# Patient Record
Sex: FEMALE | HISPANIC OR LATINO | Employment: FULL TIME | URBAN - METROPOLITAN AREA
[De-identification: names, ages, dates, MRNs, and addresses within clinical notes are randomized per-mention and may not be internally consistent; named-entity substitution may affect disease eponyms.]

---

## 2019-04-11 ENCOUNTER — APPOINTMENT (OUTPATIENT)
Dept: RADIOLOGY | Facility: MEDICAL CENTER | Age: 39
DRG: 189 | End: 2019-04-11
Attending: EMERGENCY MEDICINE
Payer: COMMERCIAL

## 2019-04-11 ENCOUNTER — HOSPITAL ENCOUNTER (INPATIENT)
Facility: MEDICAL CENTER | Age: 39
LOS: 2 days | DRG: 189 | End: 2019-04-13
Attending: EMERGENCY MEDICINE | Admitting: INTERNAL MEDICINE
Payer: COMMERCIAL

## 2019-04-11 DIAGNOSIS — J45.901 MODERATE ASTHMA WITH ACUTE EXACERBATION, UNSPECIFIED WHETHER PERSISTENT: ICD-10-CM

## 2019-04-11 PROBLEM — J96.01 ACUTE RESPIRATORY FAILURE WITH HYPOXIA (HCC): Status: ACTIVE | Noted: 2019-04-11

## 2019-04-11 PROBLEM — R01.1 MURMUR: Status: ACTIVE | Noted: 2019-04-11

## 2019-04-11 PROBLEM — J45.41 MODERATE PERSISTENT ASTHMA WITH ACUTE EXACERBATION: Status: ACTIVE | Noted: 2019-04-11

## 2019-04-11 LAB
ALBUMIN SERPL BCP-MCNC: 4.5 G/DL (ref 3.2–4.9)
ALBUMIN/GLOB SERPL: 1.5 G/DL
ALP SERPL-CCNC: 75 U/L (ref 30–99)
ALT SERPL-CCNC: 15 U/L (ref 2–50)
ANION GAP SERPL CALC-SCNC: 7 MMOL/L (ref 0–11.9)
APPEARANCE UR: CLEAR
AST SERPL-CCNC: 14 U/L (ref 12–45)
BACTERIA #/AREA URNS HPF: NEGATIVE /HPF
BASOPHILS # BLD AUTO: 0.4 % (ref 0–1.8)
BASOPHILS # BLD: 0.04 K/UL (ref 0–0.12)
BILIRUB SERPL-MCNC: 0.4 MG/DL (ref 0.1–1.5)
BILIRUB UR QL STRIP.AUTO: NEGATIVE
BUN SERPL-MCNC: 6 MG/DL (ref 8–22)
CALCIUM SERPL-MCNC: 9.3 MG/DL (ref 8.5–10.5)
CHLORIDE SERPL-SCNC: 106 MMOL/L (ref 96–112)
CO2 SERPL-SCNC: 24 MMOL/L (ref 20–33)
COLOR UR AUTO: YELLOW
COLOR UR AUTO: YELLOW
COLOR UR: YELLOW
CREAT SERPL-MCNC: 0.54 MG/DL (ref 0.5–1.4)
EKG IMPRESSION: NORMAL
EOSINOPHIL # BLD AUTO: 0.07 K/UL (ref 0–0.51)
EOSINOPHIL NFR BLD: 0.7 % (ref 0–6.9)
EPI CELLS #/AREA URNS HPF: ABNORMAL /HPF
ERYTHROCYTE [DISTWIDTH] IN BLOOD BY AUTOMATED COUNT: 46.1 FL (ref 35.9–50)
FLUAV RNA SPEC QL NAA+PROBE: NEGATIVE
FLUBV RNA SPEC QL NAA+PROBE: NEGATIVE
GLOBULIN SER CALC-MCNC: 3.1 G/DL (ref 1.9–3.5)
GLUCOSE SERPL-MCNC: 146 MG/DL (ref 65–99)
GLUCOSE UR QL STRIP.AUTO: NEGATIVE MG/DL
GLUCOSE UR QL STRIP.AUTO: NEGATIVE MG/DL
GLUCOSE UR STRIP.AUTO-MCNC: NEGATIVE MG/DL
HCG UR QL: NEGATIVE
HCT VFR BLD AUTO: 43.5 % (ref 37–47)
HGB BLD-MCNC: 14.1 G/DL (ref 12–16)
HYALINE CASTS #/AREA URNS LPF: ABNORMAL /LPF
IMM GRANULOCYTES # BLD AUTO: 0.03 K/UL (ref 0–0.11)
IMM GRANULOCYTES NFR BLD AUTO: 0.3 % (ref 0–0.9)
KETONES UR QL STRIP.AUTO: 40 MG/DL
KETONES UR QL STRIP.AUTO: 40 MG/DL
KETONES UR STRIP.AUTO-MCNC: 15 MG/DL
LEUKOCYTE ESTERASE UR QL STRIP.AUTO: NEGATIVE
LYMPHOCYTES # BLD AUTO: 0.44 K/UL (ref 1–4.8)
LYMPHOCYTES NFR BLD: 4.6 % (ref 22–41)
MCH RBC QN AUTO: 30 PG (ref 27–33)
MCHC RBC AUTO-ENTMCNC: 32.4 G/DL (ref 33.6–35)
MCV RBC AUTO: 92.6 FL (ref 81.4–97.8)
MICRO URNS: ABNORMAL
MONOCYTES # BLD AUTO: 0.18 K/UL (ref 0–0.85)
MONOCYTES NFR BLD AUTO: 1.9 % (ref 0–13.4)
NEUTROPHILS # BLD AUTO: 8.76 K/UL (ref 2–7.15)
NEUTROPHILS NFR BLD: 92.1 % (ref 44–72)
NITRITE UR QL STRIP.AUTO: NEGATIVE
NRBC # BLD AUTO: 0 K/UL
NRBC BLD-RTO: 0 /100 WBC
PH UR STRIP.AUTO: 6 [PH]
PLATELET # BLD AUTO: 276 K/UL (ref 164–446)
PMV BLD AUTO: 9.9 FL (ref 9–12.9)
POTASSIUM SERPL-SCNC: 3.8 MMOL/L (ref 3.6–5.5)
PROCALCITONIN SERPL-MCNC: <0.05 NG/ML
PROCALCITONIN SERPL-MCNC: <0.05 NG/ML
PROT SERPL-MCNC: 7.6 G/DL (ref 6–8.2)
PROT UR QL STRIP: NEGATIVE MG/DL
RBC # BLD AUTO: 4.7 M/UL (ref 4.2–5.4)
RBC # URNS HPF: ABNORMAL /HPF
RBC UR QL AUTO: ABNORMAL
SODIUM SERPL-SCNC: 137 MMOL/L (ref 135–145)
SP GR UR STRIP.AUTO: 1.01
SP GR UR: 1.01
SP GR UR: 1.02
TROPONIN I SERPL-MCNC: <0.01 NG/ML (ref 0–0.04)
UROBILINOGEN UR STRIP.AUTO-MCNC: 0.2 MG/DL
WBC # BLD AUTO: 9.5 K/UL (ref 4.8–10.8)
WBC #/AREA URNS HPF: ABNORMAL /HPF

## 2019-04-11 PROCEDURE — 80053 COMPREHEN METABOLIC PANEL: CPT

## 2019-04-11 PROCEDURE — 84484 ASSAY OF TROPONIN QUANT: CPT

## 2019-04-11 PROCEDURE — 700111 HCHG RX REV CODE 636 W/ 250 OVERRIDE (IP): Performed by: EMERGENCY MEDICINE

## 2019-04-11 PROCEDURE — 93005 ELECTROCARDIOGRAM TRACING: CPT | Performed by: EMERGENCY MEDICINE

## 2019-04-11 PROCEDURE — 81025 URINE PREGNANCY TEST: CPT

## 2019-04-11 PROCEDURE — 700102 HCHG RX REV CODE 250 W/ 637 OVERRIDE(OP): Performed by: INTERNAL MEDICINE

## 2019-04-11 PROCEDURE — 84145 PROCALCITONIN (PCT): CPT | Mod: 91

## 2019-04-11 PROCEDURE — 99223 1ST HOSP IP/OBS HIGH 75: CPT | Performed by: INTERNAL MEDICINE

## 2019-04-11 PROCEDURE — 81001 URINALYSIS AUTO W/SCOPE: CPT

## 2019-04-11 PROCEDURE — 85025 COMPLETE CBC W/AUTO DIFF WBC: CPT

## 2019-04-11 PROCEDURE — 99285 EMERGENCY DEPT VISIT HI MDM: CPT

## 2019-04-11 PROCEDURE — 700101 HCHG RX REV CODE 250: Performed by: EMERGENCY MEDICINE

## 2019-04-11 PROCEDURE — 36415 COLL VENOUS BLD VENIPUNCTURE: CPT

## 2019-04-11 PROCEDURE — 770006 HCHG ROOM/CARE - MED/SURG/GYN SEMI*

## 2019-04-11 PROCEDURE — 81002 URINALYSIS NONAUTO W/O SCOPE: CPT | Mod: 91

## 2019-04-11 PROCEDURE — 87502 INFLUENZA DNA AMP PROBE: CPT

## 2019-04-11 PROCEDURE — 71045 X-RAY EXAM CHEST 1 VIEW: CPT

## 2019-04-11 PROCEDURE — 94640 AIRWAY INHALATION TREATMENT: CPT

## 2019-04-11 PROCEDURE — A9270 NON-COVERED ITEM OR SERVICE: HCPCS | Performed by: INTERNAL MEDICINE

## 2019-04-11 PROCEDURE — 99407 BEHAV CHNG SMOKING > 10 MIN: CPT

## 2019-04-11 PROCEDURE — 700111 HCHG RX REV CODE 636 W/ 250 OVERRIDE (IP): Performed by: INTERNAL MEDICINE

## 2019-04-11 PROCEDURE — 304561 HCHG STAT O2

## 2019-04-11 RX ORDER — POLYETHYLENE GLYCOL 3350 17 G/17G
1 POWDER, FOR SOLUTION ORAL
Status: DISCONTINUED | OUTPATIENT
Start: 2019-04-11 | End: 2019-04-13 | Stop reason: HOSPADM

## 2019-04-11 RX ORDER — IPRATROPIUM BROMIDE AND ALBUTEROL SULFATE 2.5; .5 MG/3ML; MG/3ML
3 SOLUTION RESPIRATORY (INHALATION)
Status: DISCONTINUED | OUTPATIENT
Start: 2019-04-12 | End: 2019-04-13

## 2019-04-11 RX ORDER — BUDESONIDE AND FORMOTEROL FUMARATE DIHYDRATE 80; 4.5 UG/1; UG/1
2 AEROSOL RESPIRATORY (INHALATION)
Status: DISCONTINUED | OUTPATIENT
Start: 2019-04-11 | End: 2019-04-13 | Stop reason: HOSPADM

## 2019-04-11 RX ORDER — PREDNISONE 20 MG/1
60 TABLET ORAL ONCE
Status: COMPLETED | OUTPATIENT
Start: 2019-04-11 | End: 2019-04-11

## 2019-04-11 RX ORDER — BISACODYL 10 MG
10 SUPPOSITORY, RECTAL RECTAL
Status: DISCONTINUED | OUTPATIENT
Start: 2019-04-11 | End: 2019-04-13 | Stop reason: HOSPADM

## 2019-04-11 RX ORDER — ONDANSETRON 4 MG/1
4 TABLET, ORALLY DISINTEGRATING ORAL ONCE
Status: COMPLETED | OUTPATIENT
Start: 2019-04-11 | End: 2019-04-11

## 2019-04-11 RX ORDER — AMOXICILLIN 250 MG
2 CAPSULE ORAL 2 TIMES DAILY
Status: DISCONTINUED | OUTPATIENT
Start: 2019-04-11 | End: 2019-04-13 | Stop reason: HOSPADM

## 2019-04-11 RX ORDER — LORATADINE 10 MG/1
10 TABLET ORAL DAILY
Status: DISCONTINUED | OUTPATIENT
Start: 2019-04-12 | End: 2019-04-13 | Stop reason: HOSPADM

## 2019-04-11 RX ORDER — LACTOBACILLUS RHAMNOSUS GG 10B CELL
1 CAPSULE ORAL
Status: DISCONTINUED | OUTPATIENT
Start: 2019-04-12 | End: 2019-04-13 | Stop reason: HOSPADM

## 2019-04-11 RX ORDER — IPRATROPIUM BROMIDE AND ALBUTEROL SULFATE 2.5; .5 MG/3ML; MG/3ML
3 SOLUTION RESPIRATORY (INHALATION)
Status: DISCONTINUED | OUTPATIENT
Start: 2019-04-11 | End: 2019-04-13 | Stop reason: HOSPADM

## 2019-04-11 RX ORDER — PREDNISONE 20 MG/1
40 TABLET ORAL DAILY
Status: DISCONTINUED | OUTPATIENT
Start: 2019-04-11 | End: 2019-04-13 | Stop reason: HOSPADM

## 2019-04-11 RX ORDER — MONTELUKAST SODIUM 10 MG/1
10 TABLET ORAL NIGHTLY
Status: DISCONTINUED | OUTPATIENT
Start: 2019-04-11 | End: 2019-04-13 | Stop reason: HOSPADM

## 2019-04-11 RX ORDER — DOXYCYCLINE 100 MG/1
100 TABLET ORAL EVERY 12 HOURS
Status: DISCONTINUED | OUTPATIENT
Start: 2019-04-11 | End: 2019-04-13 | Stop reason: HOSPADM

## 2019-04-11 RX ORDER — ACETAMINOPHEN 325 MG/1
650 TABLET ORAL EVERY 6 HOURS PRN
Status: DISCONTINUED | OUTPATIENT
Start: 2019-04-11 | End: 2019-04-13 | Stop reason: HOSPADM

## 2019-04-11 RX ADMIN — MONTELUKAST SODIUM 10 MG: 10 TABLET, COATED ORAL at 19:50

## 2019-04-11 RX ADMIN — IPRATROPIUM BROMIDE 0.5 MG: 0.5 SOLUTION RESPIRATORY (INHALATION) at 11:35

## 2019-04-11 RX ADMIN — ALBUTEROL SULFATE 2.5 MG: 2.5 SOLUTION RESPIRATORY (INHALATION) at 13:17

## 2019-04-11 RX ADMIN — DOXYCYCLINE 100 MG: 100 TABLET, FILM COATED ORAL at 19:49

## 2019-04-11 RX ADMIN — PREDNISONE 40 MG: 20 TABLET ORAL at 19:49

## 2019-04-11 RX ADMIN — ONDANSETRON 4 MG: 4 TABLET, ORALLY DISINTEGRATING ORAL at 11:39

## 2019-04-11 RX ADMIN — ALBUTEROL SULFATE 2.5 MG: 2.5 SOLUTION RESPIRATORY (INHALATION) at 15:00

## 2019-04-11 RX ADMIN — PREDNISONE 60 MG: 20 TABLET ORAL at 11:39

## 2019-04-11 RX ADMIN — BUDESONIDE AND FORMOTEROL FUMARATE DIHYDRATE 2 PUFF: 80; 4.5 AEROSOL RESPIRATORY (INHALATION) at 23:25

## 2019-04-11 RX ADMIN — IPRATROPIUM BROMIDE 0.5 MG: 0.5 SOLUTION RESPIRATORY (INHALATION) at 15:00

## 2019-04-11 RX ADMIN — IPRATROPIUM BROMIDE 0.5 MG: 0.5 SOLUTION RESPIRATORY (INHALATION) at 13:17

## 2019-04-11 RX ADMIN — ALBUTEROL SULFATE 2.5 MG: 2.5 SOLUTION RESPIRATORY (INHALATION) at 11:35

## 2019-04-11 ASSESSMENT — LIFESTYLE VARIABLES
ON A TYPICAL DAY WHEN YOU DRINK ALCOHOL HOW MANY DRINKS DO YOU HAVE: 1
EVER HAD A DRINK FIRST THING IN THE MORNING TO STEADY YOUR NERVES TO GET RID OF A HANGOVER: NO
HOW MANY TIMES IN THE PAST YEAR HAVE YOU HAD 5 OR MORE DRINKS IN A DAY: 0
EVER_SMOKED: NEVER
TOTAL SCORE: 0
HAVE PEOPLE ANNOYED YOU BY CRITICIZING YOUR DRINKING: NO
ALCOHOL_USE: YES
HAVE YOU EVER FELT YOU SHOULD CUT DOWN ON YOUR DRINKING: NO
EVER FELT BAD OR GUILTY ABOUT YOUR DRINKING: NO
CONSUMPTION TOTAL: NEGATIVE
TOTAL SCORE: 0
TOTAL SCORE: 0
EVER_SMOKED: NEVER
AVERAGE NUMBER OF DAYS PER WEEK YOU HAVE A DRINK CONTAINING ALCOHOL: 1

## 2019-04-11 ASSESSMENT — COGNITIVE AND FUNCTIONAL STATUS - GENERAL
SUGGESTED CMS G CODE MODIFIER MOBILITY: CH
MOBILITY SCORE: 24
SUGGESTED CMS G CODE MODIFIER DAILY ACTIVITY: CH
DAILY ACTIVITIY SCORE: 24

## 2019-04-11 ASSESSMENT — PATIENT HEALTH QUESTIONNAIRE - PHQ9
1. LITTLE INTEREST OR PLEASURE IN DOING THINGS: NOT AT ALL
SUM OF ALL RESPONSES TO PHQ9 QUESTIONS 1 AND 2: 0
2. FEELING DOWN, DEPRESSED, IRRITABLE, OR HOPELESS: NOT AT ALL

## 2019-04-11 ASSESSMENT — COPD QUESTIONNAIRES
HAVE YOU SMOKED AT LEAST 100 CIGARETTES IN YOUR ENTIRE LIFE: NO/DON'T KNOW
DO YOU EVER COUGH UP ANY MUCUS OR PHLEGM?: NO/ONLY WITH OCCASIONAL COLDS OR INFECTIONS
DURING THE PAST 4 WEEKS HOW MUCH DID YOU FEEL SHORT OF BREATH: SOME OF THE TIME
COPD SCREENING SCORE: 1

## 2019-04-11 NOTE — ED NOTES
Verbalized relief of breathing, still has wheezing on auscultation. Will call RT for repeat nebulizer treatment.

## 2019-04-11 NOTE — FLOWSHEET NOTE
04/11/19 1319   Interdisciplinary Plan of Care-Goals (Indications)   Bronchodilator Indications History / Diagnosis   Interdisciplinary Plan of Care-Outcomes    Bronchodilator Outcome Improved Vital Signs and Measures of Gas Exchange   RT Assessment of Delivered Medications   Evaluation of Medication Delivery Daily Yes-- Pt /Family has been Instructed in use of Respiratory Medications and Adverse Reactions   SVN Group   #SVN Performed Yes   Given By: Mouthpiece   Chest Exam   Respiration 18   Pulse 85   Breath Sounds   RUL Breath Sounds Diminished   RML Breath Sounds Diminished   RLL Breath Sounds Diminished   PETRA Breath Sounds Diminished   LLL Breath Sounds Diminished   Oximetry   Continuous Oximetry Yes   O2 Alarms Set & Reviewed Yes   Oxygen   Pulse Oximetry 96 %   O2 (LPM) 2   O2 Daily Delivery Respiratory  Silicone Nasal Cannula

## 2019-04-11 NOTE — H&P
Hospital Medicine History & Physical Note    Date of Service  4/11/2019    Primary Care Physician  Pcp Pt States None    Consultants      Code Status  full    Chief Complaint  Shortness of Breath (audible wheezes, unrelieved with inhaler)      History of Presenting Illness  38 y.o. female who presented 4/11/2019 with Shortness of Breath (audible wheezes, unrelieved with inhaler)  History of asthma.,  Cough, a little yellow phlegm, SOB wheezing since yesterday.   No fever or chills  No sick contacts, no recent travel  No heart problems.  Denies smoking or alcohol.  At the ED, afebrile, hemodynamically stable. Hypoxic, put on O2.  CXR clear.  No leukocytosois.   When I saw her at the ED, no acute distress. Minimal wheezing. Family at bedside.  Review of Systems  ROS    Past Medical History   has a past medical history of AMA (advanced maternal age) multigravida 35+ (7/19/2016).    Surgical History   has no past surgical history on file.     Family History  family history includes Diabetes in her mother.   Asthma in her aunt  Social History   reports that she has never smoked. She has never used smokeless tobacco. She reports that she does not drink alcohol or use drugs.    Allergies  No Known Allergies    Medications  None       Physical Exam  Temp:  [36.7 °C (98 °F)] 36.7 °C (98 °F)  Pulse:  [82-96] 82  Resp:  [18-26] 18  BP: (130)/(84) 130/84  SpO2:  [94 %-97 %] 94 %    Physical Exam   Constitutional: She appears well-developed.   HENT:   Head: Normocephalic and atraumatic.   Eyes: Conjunctivae are normal. No scleral icterus.   Neck: Normal range of motion. Neck supple.   Cardiovascular: Normal rate and regular rhythm.  Exam reveals no gallop and no friction rub.    Murmur heard.  Pulmonary/Chest: Effort normal. No respiratory distress. She has wheezes. She has no rales.   Abdominal: Soft. Bowel sounds are normal. She exhibits no distension. There is no tenderness. There is no rebound and no guarding.    Musculoskeletal: She exhibits no edema or tenderness.   Neurological: She is alert.   Skin: Skin is warm.   Psychiatric: She has a normal mood and affect. Her behavior is normal.       Laboratory:  Recent Labs      04/11/19   1450   WBC  9.5   RBC  4.70   HEMOGLOBIN  14.1   HEMATOCRIT  43.5   MCV  92.6   MCH  30.0   MCHC  32.4*   RDW  46.1   PLATELETCT  276   MPV  9.9     Recent Labs      04/11/19   1450   SODIUM  137   POTASSIUM  3.8   CHLORIDE  106   CO2  24   GLUCOSE  146*   BUN  6*   CREATININE  0.54   CALCIUM  9.3     Recent Labs      04/11/19   1450   ALTSGPT  15   ASTSGOT  14   ALKPHOSPHAT  75   TBILIRUBIN  0.4   GLUCOSE  146*                 Recent Labs      04/11/19   1450   TROPONINI  <0.01       Urinalysis:    Recent Labs      04/11/19   1211   SPECGRAVITY  1.011   GLUCOSEUR  Negative   KETONES  15*   NITRITE  Negative   LEUKESTERAS  Negative   WBCURINE  0-2   RBCURINE  2-5*   BACTERIA  Negative   EPITHELCELL  Few        Imaging:  DX-CHEST-PORTABLE (1 VIEW)   Final Result      No acute cardiopulmonary disease evident.      EC-ECHOCARDIOGRAM COMPLETE W/O CONT    (Results Pending)         Assessment/Plan:  I anticipate this patient will require at least two midnights for appropriate medical management, necessitating inpatient admission.    * Acute respiratory failure with hypoxia with asthma exacerbation (HCC)   Assessment & Plan    Resp and O2 per protocol  Treat asthma     Moderate persistent asthma with acute exacerbation   Assessment & Plan    Resp (breathing treatments) and O2 per protocol  PO steroids  PO antibiotics, follow procalcitonin  Montelukast, Claritin ordered     Murmur   Assessment & Plan    Ordered echo         VTE prophylaxis: Loevnox SQ  Reviewed vitals, labs, imaging, staff notes.  Discussed assessment and plan with Quincy Vela  Discussed with ED physician.

## 2019-04-11 NOTE — FLOWSHEET NOTE
04/11/19 1502   Events/Summary/Plan   Events/Summary/Plan Repeat SVN given   SVN Group   #SVN Performed Yes   Given By: Mouthpiece   Respiratory WDL   Respiratory (WDL) X   Chest Exam   Work Of Breathing / Effort Mild   Respiration 18   Pulse 82   Breath Sounds   Pre/Post Intervention Pre Intervention Assessment   RUL Breath Sounds Inspiratory Wheezes   RML Breath Sounds Diminished   RLL Breath Sounds Diminished   PETRA Breath Sounds Inspiratory Wheezes   LLL Breath Sounds Diminished   Oximetry   Continuous Oximetry Yes   Oxygen   Pulse Oximetry 94 %   O2 (LPM) 2   O2 Daily Delivery Respiratory  Silicone Nasal Cannula

## 2019-04-11 NOTE — ED TRIAGE NOTES
Chief Complaint   Patient presents with   • Shortness of Breath     audible wheezes, unrelieved with inhaler

## 2019-04-11 NOTE — ED NOTES
Pt sating between 88-90% on ra. RT called for repeat nebulizer tx. Placed back on 2lnc. Updated with the poc.  piv established blood collected, sent to lab. ED tech to do EKG.

## 2019-04-11 NOTE — ED PROVIDER NOTES
ED Provider Note    CHIEF COMPLAINT  Chief Complaint   Patient presents with   • Shortness of Breath     audible wheezes, unrelieved with inhaler       HPI  Quincy Vela is a 38 y.o. female with a history of asthma who presents with cough, congestion, difficulty breathing for the past 2 days.  The patient has been using her inhaler without improvement.  She has had subjective fevers and chills along with a sore throat, cough, and congestion.  She says the cough has been productive of yellowish sputum.  She did have one episode of vomiting today.  She denies any current chest pain or abdominal pain, but has had some low back pain.  She denies any urinary symptoms or diarrhea.    REVIEW OF SYSTEMS  See HPI for further details. All other systems are negative.     PAST MEDICAL HISTORY  Past Medical History:   Diagnosis Date   • AMA (advanced maternal age) multigravida 35+ 7/19/2016       FAMILY HISTORY  Family History   Problem Relation Age of Onset   • Diabetes Mother         takes meds       SOCIAL HISTORY  Social History     Social History   • Marital status: Single     Spouse name: N/A   • Number of children: N/A   • Years of education: N/A     Social History Main Topics   • Smoking status: Never Smoker   • Smokeless tobacco: Never Used   • Alcohol use No   • Drug use: No   • Sexual activity: Yes     Partners: Male      Comment: NONE      Other Topics Concern   • Not on file     Social History Narrative   • No narrative on file       SURGICAL HISTORY  History reviewed. No pertinent surgical history.    CURRENT MEDICATIONS  Home Medications     Reviewed by Shyanne Atwood R.N. (Registered Nurse) on 04/11/19 at 1055  Med List Status: Partial   Medication Last Dose Status   docusate sodium 100 MG Cap not taking Active   hydrocodone-acetaminophen (NORCO) 5-325 MG Tab per tablet not taking Active   ibuprofen (MOTRIN) 800 MG Tab not taking Active   Prenatal MV-Min-Fe Fum-FA-DHA (PRENATAL 1 PO) not taking  "Active                ALLERGIES  No Known Allergies    PHYSICAL EXAM  VITAL SIGNS: /84   Pulse 96   Temp 36.7 °C (98 °F) (Temporal)   Resp (!) 26   Ht 1.651 m (5' 5\")   Wt 79.4 kg (175 lb 0.7 oz)   SpO2 97%   BMI 29.13 kg/m²   Constitutional: Awake, alert, appears mildly tachypneic, nontoxic.  HENT: Atraumatic. Bilateral external ears normal, mucous membranes moist, throat nonerythematous without exudates, nose is normal.  Eyes: PERRL, EOMI, conjunctiva moist, noninjected.  Neck: Nontender, Normal range of motion, No nuchal rigidity, No stridor.   Lymphatic: No lymphadenopathy noted.   Cardiovascular: Regular rate and rhythm, no murmurs, rubs, gallops.  Thorax & Lungs: Diminished breath sounds bilaterally, diffuse expiratory wheezes, mild intercostal retractions and use of accessory muscles, no rales.  No chest tenderness.  Abdomen: Bowel sounds normal, Soft, nontender, nondistended, no rebound, guarding, masses.  Back: No CVA or spinal tenderness,   Extremities: Intact distal pulses, No edema, No tenderness.   Skin: Warm, Dry, No rashes.   Musculoskeletal: No joint swelling or tenderness.  Neurologic: Alert & oriented x 3, sensory and motor function normal. No focal deficits.   Psychiatric: Affect normal, Judgment normal, Mood normal.       RADIOLOGY/PROCEDURES  DX-CHEST-PORTABLE (1 VIEW)   Final Result      No acute cardiopulmonary disease evident.        EKG:  Twelve-lead EKG shows a normal sinus rhythm, rate of 94, normal intervals, normal axis, no acute ST wave elevations or ST depressions, no pathologic Q waves, no evidence of an acute injury or ischemic pattern on my reading, there is no previous EKG for comparison.    COURSE & MEDICAL DECISION MAKING  Pertinent Labs & Imaging studies reviewed. (See chart for details)  The patient presents with the above complaints.  She showed increased work of breathing along with diffuse wheezing and use of accessory muscles.  She was given prednisone 60 mg " orally.  She was given a Atrovent/severe nebulizer treatment.  On recheck, she had improved breath sounds, but still was tachypneic with wheezing.  She was then given a second albuterol/Atrovent nebulizer treatment.  Chest x-ray showed no infiltrate.  The patient was found to be hypoxic with oxygen saturation of 88% on room air.  She was placed on supplemental oxygen.  He was given a third Atrovent/severe nebulizer treatment.  On recheck she still showed increased work of breathing, diminished breath sounds.  Oxygen saturation remained at 88-90% on room air.  Given her symptoms, patient will be admitted.  Blood work and blood cultures were obtained.  CBC showed a white count 9500, hemoglobin 14.1, 92% polys, chemistry shows a glucose of 146, otherwise normal, troponin less than 0.01, urine pregnancy negative, urinalysis shows 15 ketones, otherwise negative.  EKG shows normal sinus rhythm.  Findings were discussed with Dr June for admission.    FINAL IMPRESSION  1.  Acute asthma exacerbation  2.  Hypoxia   3.         Electronically signed by: Harshal Jaramillo, 4/11/2019 12:13 PM

## 2019-04-11 NOTE — ED NOTES
Med Rec completed per patient  Allergies reviewed  No ORAL antibiotics in last 30 days    Patient takes no OTC or prescription medications

## 2019-04-11 NOTE — ED NOTES
Ambulates to room w/steady gait. Able to speak in full sentences.  Skin pwd. Chart up for erp to see.

## 2019-04-12 ENCOUNTER — APPOINTMENT (OUTPATIENT)
Dept: CARDIOLOGY | Facility: MEDICAL CENTER | Age: 39
DRG: 189 | End: 2019-04-12
Attending: INTERNAL MEDICINE
Payer: COMMERCIAL

## 2019-04-12 PROBLEM — R73.9 HYPERGLYCEMIA: Status: ACTIVE | Noted: 2019-04-12

## 2019-04-12 LAB
ANION GAP SERPL CALC-SCNC: 7 MMOL/L (ref 0–11.9)
BUN SERPL-MCNC: 9 MG/DL (ref 8–22)
CALCIUM SERPL-MCNC: 9 MG/DL (ref 8.5–10.5)
CHLORIDE SERPL-SCNC: 105 MMOL/L (ref 96–112)
CO2 SERPL-SCNC: 24 MMOL/L (ref 20–33)
CREAT SERPL-MCNC: 0.43 MG/DL (ref 0.5–1.4)
ERYTHROCYTE [DISTWIDTH] IN BLOOD BY AUTOMATED COUNT: 45.7 FL (ref 35.9–50)
GLUCOSE SERPL-MCNC: 149 MG/DL (ref 65–99)
HCT VFR BLD AUTO: 41.4 % (ref 37–47)
HGB BLD-MCNC: 13.5 G/DL (ref 12–16)
LV EJECT FRACT  99904: 70
LV EJECT FRACT MOD 2C 99903: 70.16
LV EJECT FRACT MOD 4C 99902: 72.12
LV EJECT FRACT MOD BP 99901: 71.11
MCH RBC QN AUTO: 29.9 PG (ref 27–33)
MCHC RBC AUTO-ENTMCNC: 32.6 G/DL (ref 33.6–35)
MCV RBC AUTO: 91.6 FL (ref 81.4–97.8)
PLATELET # BLD AUTO: 300 K/UL (ref 164–446)
PMV BLD AUTO: 10 FL (ref 9–12.9)
POTASSIUM SERPL-SCNC: 4.1 MMOL/L (ref 3.6–5.5)
RBC # BLD AUTO: 4.52 M/UL (ref 4.2–5.4)
SODIUM SERPL-SCNC: 136 MMOL/L (ref 135–145)
WBC # BLD AUTO: 10.8 K/UL (ref 4.8–10.8)

## 2019-04-12 PROCEDURE — A9270 NON-COVERED ITEM OR SERVICE: HCPCS | Performed by: INTERNAL MEDICINE

## 2019-04-12 PROCEDURE — 700101 HCHG RX REV CODE 250: Performed by: INTERNAL MEDICINE

## 2019-04-12 PROCEDURE — 94640 AIRWAY INHALATION TREATMENT: CPT

## 2019-04-12 PROCEDURE — 36415 COLL VENOUS BLD VENIPUNCTURE: CPT

## 2019-04-12 PROCEDURE — 94760 N-INVAS EAR/PLS OXIMETRY 1: CPT

## 2019-04-12 PROCEDURE — 770006 HCHG ROOM/CARE - MED/SURG/GYN SEMI*

## 2019-04-12 PROCEDURE — 94664 DEMO&/EVAL PT USE INHALER: CPT

## 2019-04-12 PROCEDURE — 700102 HCHG RX REV CODE 250 W/ 637 OVERRIDE(OP): Performed by: INTERNAL MEDICINE

## 2019-04-12 PROCEDURE — 93306 TTE W/DOPPLER COMPLETE: CPT | Mod: 26 | Performed by: INTERNAL MEDICINE

## 2019-04-12 PROCEDURE — 99232 SBSQ HOSP IP/OBS MODERATE 35: CPT | Performed by: INTERNAL MEDICINE

## 2019-04-12 PROCEDURE — 80048 BASIC METABOLIC PNL TOTAL CA: CPT

## 2019-04-12 PROCEDURE — 85027 COMPLETE CBC AUTOMATED: CPT

## 2019-04-12 PROCEDURE — 93306 TTE W/DOPPLER COMPLETE: CPT

## 2019-04-12 PROCEDURE — 700111 HCHG RX REV CODE 636 W/ 250 OVERRIDE (IP): Performed by: INTERNAL MEDICINE

## 2019-04-12 RX ADMIN — IPRATROPIUM BROMIDE AND ALBUTEROL SULFATE 3 ML: .5; 3 SOLUTION RESPIRATORY (INHALATION) at 14:30

## 2019-04-12 RX ADMIN — DOXYCYCLINE 100 MG: 100 TABLET, FILM COATED ORAL at 05:43

## 2019-04-12 RX ADMIN — SENNOSIDES,DOCUSATE SODIUM 2 TABLET: 8.6; 5 TABLET, FILM COATED ORAL at 17:38

## 2019-04-12 RX ADMIN — MONTELUKAST SODIUM 10 MG: 10 TABLET, COATED ORAL at 21:02

## 2019-04-12 RX ADMIN — IPRATROPIUM BROMIDE AND ALBUTEROL SULFATE 3 ML: .5; 3 SOLUTION RESPIRATORY (INHALATION) at 17:25

## 2019-04-12 RX ADMIN — DOXYCYCLINE 100 MG: 100 TABLET, FILM COATED ORAL at 17:38

## 2019-04-12 RX ADMIN — BUDESONIDE AND FORMOTEROL FUMARATE DIHYDRATE 2 PUFF: 80; 4.5 AEROSOL RESPIRATORY (INHALATION) at 07:46

## 2019-04-12 RX ADMIN — IPRATROPIUM BROMIDE AND ALBUTEROL SULFATE 3 ML: .5; 3 SOLUTION RESPIRATORY (INHALATION) at 07:46

## 2019-04-12 RX ADMIN — Medication 1 CAPSULE: at 07:53

## 2019-04-12 RX ADMIN — LORATADINE 10 MG: 10 TABLET ORAL at 05:43

## 2019-04-12 RX ADMIN — SENNOSIDES,DOCUSATE SODIUM 2 TABLET: 8.6; 5 TABLET, FILM COATED ORAL at 05:43

## 2019-04-12 RX ADMIN — PREDNISONE 40 MG: 20 TABLET ORAL at 05:43

## 2019-04-12 ASSESSMENT — ENCOUNTER SYMPTOMS
CHILLS: 0
DIZZINESS: 0
BLURRED VISION: 0
MYALGIAS: 0
PHOTOPHOBIA: 0
VOMITING: 0
COUGH: 0
NECK PAIN: 0
FEVER: 0
BRUISES/BLEEDS EASILY: 0
DOUBLE VISION: 0
ORTHOPNEA: 0
TINGLING: 0
HEADACHES: 0
BACK PAIN: 0
HEMOPTYSIS: 0
SPUTUM PRODUCTION: 0
POLYDIPSIA: 0
WEIGHT LOSS: 0
NAUSEA: 0
HEARTBURN: 0
PALPITATIONS: 0
DEPRESSION: 0

## 2019-04-12 ASSESSMENT — LIFESTYLE VARIABLES: SUBSTANCE_ABUSE: 0

## 2019-04-12 NOTE — PROGRESS NOTES
Pt transferred from ED via gurney accompanied by transporter and multiple family members.  Pt arrived to floor at 1841.  Pt upself to bed with SBA.  Pt has no complaints of pain at this time and is on 2L O2 via NC.  Updated pt on unit routine including call light system, hourly rounding, white board information, and visitors policy.  Bed in lowest position, locked, and pt instructed on need to call for assistance with any needs.  Meal ordered for pt.

## 2019-04-12 NOTE — PROGRESS NOTES
· 2 RN skin check complete with FARHAN Araujo.  · Devices in place: PIV and NC.  · Skin assessed under devices: yes.  · Confirmed pressure ulcers found on: N/A.  · New potential pressure ulcers noted on: N/A. Wound consult placed and wound reported (N/A).  · The following interventions in place: Pressure redistribution mattress, patient encouraged to reposition frequently while in bed.    All pt skin intact, pink, and blanching. No issues noted. Pt is ambulatory and able to reposition herself without difficulty.

## 2019-04-12 NOTE — CARE PLAN
Problem: Communication  Goal: The ability to communicate needs accurately and effectively will improve  Outcome: PROGRESSING AS EXPECTED  Educated patient and daughter who is at bedside to let the RN know if they have any needs. Patient and daughter verbalized understanding.     Problem: Respiratory:  Goal: Respiratory status will improve  Outcome: PROGRESSING AS EXPECTED  Educated patient and daughter to let RN know if she becomes short of breath or has any change in her respiratory status. Patient and daughter vernalized understanding.

## 2019-04-12 NOTE — ASSESSMENT & PLAN NOTE
Resp (breathing treatments) and O2 per protocol  PO steroids  PO antibiotics, follow procalcitonin  Montelukast, Claritin ordered    Clinically improving.  Still on oxygen 1 L  Continue treatment, wean down oxygen as tolerated

## 2019-04-12 NOTE — RESPIRATORY CARE
COPD EDUCATION by COPD CLINICAL EDUCATOR  4/12/2019  at  10:55 AM by Michelle Ro     Patient interviewed by COPD education team.  Patient has asthma, not COPD and is a lifetime non-smoker. Short intervention has been conducted during which the patient was instructed on MDI spacer use her daughter translated.

## 2019-04-12 NOTE — PROGRESS NOTES
Patient A/Ox4 up ad jordy with steady gait. She is considered a No fall risk so bed is in low and locked position, call light within reach and used appropriately. Pt is Urdu speaking with a little English, refuses  line since daughter Olesya will be staying with her and daughter speaks English. Pt is on sating 94% on 2 LPM O2, RA is baseline. No complaints of pain, family at bedside. Welcome folder and quiet pack given and explained. No signs of distress.

## 2019-04-12 NOTE — PROGRESS NOTES
[facility] Medicine Daily Progress Note    Date of Service  4/12/2019    Chief Complaint  38 y.o. female with history of well-controlled asthma admitted 4/11/2019 with complaints of shortness of breath, wheezes, cough      Interval Problem Update  Patient states that shortness of breath improved.  She was weaned down to 1 L nasal cannula from 2 L of nasal cannula yesterday.,  However she feels more fatigued and weak on 1 L.  Has dry cough.  Mild bilateral wheezes on expiration in the upper lobes   I discussed points of care with the patient and her daughter at bedside in details, as well as with nursing staff    Consultants/Specialty  None    Code Status  Full code    Disposition  Possibly home tomorrow if weaned off oxygen    Review of Systems  Review of Systems   Constitutional: Negative for chills, fever and weight loss.   HENT: Negative for ear pain, hearing loss and tinnitus.    Eyes: Negative for blurred vision, double vision and photophobia.   Respiratory: Negative for cough, hemoptysis and sputum production.    Cardiovascular: Negative for chest pain, palpitations and orthopnea.   Gastrointestinal: Negative for heartburn, nausea and vomiting.   Genitourinary: Negative for dysuria, frequency and urgency.   Musculoskeletal: Negative for back pain, myalgias and neck pain.   Skin: Negative for itching and rash.   Neurological: Negative for dizziness, tingling and headaches.   Endo/Heme/Allergies: Negative for environmental allergies and polydipsia. Does not bruise/bleed easily.   Psychiatric/Behavioral: Negative for depression, substance abuse and suicidal ideas.        Physical Exam  Temp:  [36.6 °C (97.9 °F)-37.2 °C (99 °F)] 36.6 °C (97.9 °F)  Pulse:  [] 64  Resp:  [14-18] 16  BP: (109-122)/(61-68) 121/61  SpO2:  [90 %-97 %] 94 %    Physical Exam   Constitutional: She is oriented to person, place, and time. She appears well-developed and well-nourished.   HENT:   Head: Normocephalic and atraumatic.   Eyes:  Pupils are equal, round, and reactive to light. EOM are normal.   Neck: Normal range of motion. Neck supple.   Cardiovascular: Normal rate and regular rhythm.    Pulmonary/Chest: Effort normal. No respiratory distress. She has decreased breath sounds. She has wheezes.   Abdominal: Soft. Bowel sounds are normal.   Musculoskeletal: Normal range of motion.   Neurological: She is alert and oriented to person, place, and time.   Skin: Skin is warm and dry.   Psychiatric: She has a normal mood and affect.   Nursing note and vitals reviewed.      Fluids    Intake/Output Summary (Last 24 hours) at 04/12/19 1537  Last data filed at 04/12/19 1400   Gross per 24 hour   Intake              600 ml   Output                0 ml   Net              600 ml       Laboratory  Recent Labs      04/11/19   1450  04/12/19   0300   WBC  9.5  10.8   RBC  4.70  4.52   HEMOGLOBIN  14.1  13.5   HEMATOCRIT  43.5  41.4   MCV  92.6  91.6   MCH  30.0  29.9   MCHC  32.4*  32.6*   RDW  46.1  45.7   PLATELETCT  276  300   MPV  9.9  10.0     Recent Labs      04/11/19   1450  04/12/19   0300   SODIUM  137  136   POTASSIUM  3.8  4.1   CHLORIDE  106  105   CO2  24  24   GLUCOSE  146*  149*   BUN  6*  9   CREATININE  0.54  0.43*   CALCIUM  9.3  9.0                   Imaging  EC-ECHOCARDIOGRAM COMPLETE W/O CONT   Final Result      DX-CHEST-PORTABLE (1 VIEW)   Final Result      No acute cardiopulmonary disease evident.           Assessment/Plan  * Acute respiratory failure with hypoxia with asthma exacerbation (HCC)   Assessment & Plan    Resp and O2 per protocol  Wean of oxygen as tolerated     Moderate persistent asthma with acute exacerbation   Assessment & Plan    Resp (breathing treatments) and O2 per protocol  PO steroids  PO antibiotics, follow procalcitonin  Montelukast, Claritin ordered    Clinically improving.  Still on oxygen 1 L  Continue treatment, wean down oxygen as tolerated     Hyperglycemia   Assessment & Plan    Possibly secondary to  steroids  We will check A1c     Murmur   Assessment & Plan    probably functional.  Heart ultrasound normal          VTE prophylaxis: Heparin

## 2019-04-12 NOTE — ED NOTES
Claire from [facility] notified that pt is on her way. Attempted to call report but still unable to get hold of rn for report.  Call back number for questions at [phone]. Pt taken to [id number] by transport via wheelchair on 2l/nc.

## 2019-04-12 NOTE — CARE PLAN
Problem: Venous Thromboembolism (VTW)/Deep Vein Thrombosis (DVT) Prevention:  Goal: Patient will participate in Venous Thrombosis (VTE)/Deep Vein Thrombosis (DVT)Prevention Measures  Outcome: PROGRESSING AS EXPECTED  Pt up and ambulating frequently. Educated/encouraged pt to do foot pumps if in bed.     Problem: Bowel/Gastric:  Goal: Normal bowel function is maintained or improved  Outcome: PROGRESSING AS EXPECTED      Problem: Respiratory:  Goal: Respiratory status will improve  Outcome: PROGRESSING SLOWER THAN EXPECTED  Attempted to ween pt off of oxygen, but on rm air pt was down to 86%. Pt remains on 1 L of O2 via NC.

## 2019-04-13 ENCOUNTER — PATIENT OUTREACH (OUTPATIENT)
Dept: HEALTH INFORMATION MANAGEMENT | Facility: OTHER | Age: 39
End: 2019-04-13

## 2019-04-13 VITALS
HEIGHT: 65 IN | DIASTOLIC BLOOD PRESSURE: 78 MMHG | BODY MASS INDEX: 29.16 KG/M2 | SYSTOLIC BLOOD PRESSURE: 134 MMHG | RESPIRATION RATE: 17 BRPM | OXYGEN SATURATION: 93 % | HEART RATE: 89 BPM | WEIGHT: 175.04 LBS | TEMPERATURE: 98.5 F

## 2019-04-13 LAB
ANION GAP SERPL CALC-SCNC: 11 MMOL/L (ref 0–11.9)
BASOPHILS # BLD AUTO: 0.4 % (ref 0–1.8)
BASOPHILS # BLD: 0.05 K/UL (ref 0–0.12)
BUN SERPL-MCNC: 17 MG/DL (ref 8–22)
CALCIUM SERPL-MCNC: 9 MG/DL (ref 8.5–10.5)
CHLORIDE SERPL-SCNC: 104 MMOL/L (ref 96–112)
CO2 SERPL-SCNC: 25 MMOL/L (ref 20–33)
CREAT SERPL-MCNC: 0.62 MG/DL (ref 0.5–1.4)
EOSINOPHIL # BLD AUTO: 0.12 K/UL (ref 0–0.51)
EOSINOPHIL NFR BLD: 0.9 % (ref 0–6.9)
ERYTHROCYTE [DISTWIDTH] IN BLOOD BY AUTOMATED COUNT: 46.5 FL (ref 35.9–50)
EST. AVERAGE GLUCOSE BLD GHB EST-MCNC: 123 MG/DL
GLUCOSE SERPL-MCNC: 103 MG/DL (ref 65–99)
HBA1C MFR BLD: 5.9 % (ref 0–5.6)
HCT VFR BLD AUTO: 42.3 % (ref 37–47)
HGB BLD-MCNC: 13.8 G/DL (ref 12–16)
IMM GRANULOCYTES # BLD AUTO: 0.06 K/UL (ref 0–0.11)
IMM GRANULOCYTES NFR BLD AUTO: 0.4 % (ref 0–0.9)
LYMPHOCYTES # BLD AUTO: 2.53 K/UL (ref 1–4.8)
LYMPHOCYTES NFR BLD: 18.5 % (ref 22–41)
MCH RBC QN AUTO: 30.1 PG (ref 27–33)
MCHC RBC AUTO-ENTMCNC: 32.6 G/DL (ref 33.6–35)
MCV RBC AUTO: 92.2 FL (ref 81.4–97.8)
MONOCYTES # BLD AUTO: 0.92 K/UL (ref 0–0.85)
MONOCYTES NFR BLD AUTO: 6.7 % (ref 0–13.4)
NEUTROPHILS # BLD AUTO: 10.03 K/UL (ref 2–7.15)
NEUTROPHILS NFR BLD: 73.1 % (ref 44–72)
NRBC # BLD AUTO: 0 K/UL
NRBC BLD-RTO: 0 /100 WBC
PLATELET # BLD AUTO: 301 K/UL (ref 164–446)
PMV BLD AUTO: 10.1 FL (ref 9–12.9)
POTASSIUM SERPL-SCNC: 3.5 MMOL/L (ref 3.6–5.5)
RBC # BLD AUTO: 4.59 M/UL (ref 4.2–5.4)
SODIUM SERPL-SCNC: 140 MMOL/L (ref 135–145)
WBC # BLD AUTO: 13.7 K/UL (ref 4.8–10.8)

## 2019-04-13 PROCEDURE — 700102 HCHG RX REV CODE 250 W/ 637 OVERRIDE(OP): Performed by: INTERNAL MEDICINE

## 2019-04-13 PROCEDURE — 83036 HEMOGLOBIN GLYCOSYLATED A1C: CPT

## 2019-04-13 PROCEDURE — 80048 BASIC METABOLIC PNL TOTAL CA: CPT

## 2019-04-13 PROCEDURE — 99239 HOSP IP/OBS DSCHRG MGMT >30: CPT | Performed by: INTERNAL MEDICINE

## 2019-04-13 PROCEDURE — A9270 NON-COVERED ITEM OR SERVICE: HCPCS | Performed by: INTERNAL MEDICINE

## 2019-04-13 PROCEDURE — 94760 N-INVAS EAR/PLS OXIMETRY 1: CPT

## 2019-04-13 PROCEDURE — 85025 COMPLETE CBC W/AUTO DIFF WBC: CPT

## 2019-04-13 PROCEDURE — 700101 HCHG RX REV CODE 250: Performed by: INTERNAL MEDICINE

## 2019-04-13 PROCEDURE — 700111 HCHG RX REV CODE 636 W/ 250 OVERRIDE (IP): Performed by: INTERNAL MEDICINE

## 2019-04-13 PROCEDURE — 94640 AIRWAY INHALATION TREATMENT: CPT

## 2019-04-13 PROCEDURE — 36415 COLL VENOUS BLD VENIPUNCTURE: CPT

## 2019-04-13 RX ORDER — IPRATROPIUM BROMIDE AND ALBUTEROL SULFATE 2.5; .5 MG/3ML; MG/3ML
3 SOLUTION RESPIRATORY (INHALATION)
Status: DISCONTINUED | OUTPATIENT
Start: 2019-04-13 | End: 2019-04-13 | Stop reason: HOSPADM

## 2019-04-13 RX ORDER — ALBUTEROL SULFATE 90 UG/1
2 AEROSOL, METERED RESPIRATORY (INHALATION) EVERY 6 HOURS PRN
Qty: 8.5 G | Refills: 1 | Status: SHIPPED | OUTPATIENT
Start: 2019-04-13

## 2019-04-13 RX ORDER — POTASSIUM CHLORIDE 20 MEQ/1
40 TABLET, EXTENDED RELEASE ORAL ONCE
Status: COMPLETED | OUTPATIENT
Start: 2019-04-13 | End: 2019-04-13

## 2019-04-13 RX ORDER — PREDNISONE 20 MG/1
40 TABLET ORAL
Qty: 30 TAB | Refills: 0 | Status: SHIPPED | OUTPATIENT
Start: 2019-04-13 | End: 2019-04-13

## 2019-04-13 RX ORDER — AMOXICILLIN AND CLAVULANATE POTASSIUM 875; 125 MG/1; MG/1
1 TABLET, FILM COATED ORAL EVERY 12 HOURS
Qty: 10 TAB | Refills: 0 | Status: SHIPPED | OUTPATIENT
Start: 2019-04-13 | End: 2019-04-18

## 2019-04-13 RX ORDER — PREDNISONE 20 MG/1
40 TABLET ORAL DAILY
Qty: 10 TAB | Refills: 0 | Status: SHIPPED | OUTPATIENT
Start: 2019-04-13 | End: 2019-04-18

## 2019-04-13 RX ORDER — AMOXICILLIN AND CLAVULANATE POTASSIUM 875; 125 MG/1; MG/1
1 TABLET, FILM COATED ORAL EVERY 12 HOURS
Status: DISCONTINUED | OUTPATIENT
Start: 2019-04-13 | End: 2019-04-13 | Stop reason: HOSPADM

## 2019-04-13 RX ORDER — DOXYCYCLINE 100 MG/1
100 TABLET ORAL EVERY 12 HOURS
Qty: 10 TAB | Refills: 0 | Status: SHIPPED | OUTPATIENT
Start: 2019-04-13 | End: 2019-04-18

## 2019-04-13 RX ADMIN — PREDNISONE 40 MG: 20 TABLET ORAL at 05:38

## 2019-04-13 RX ADMIN — Medication 1 CAPSULE: at 08:31

## 2019-04-13 RX ADMIN — LORATADINE 10 MG: 10 TABLET ORAL at 05:38

## 2019-04-13 RX ADMIN — IPRATROPIUM BROMIDE AND ALBUTEROL SULFATE 3 ML: .5; 3 SOLUTION RESPIRATORY (INHALATION) at 10:41

## 2019-04-13 RX ADMIN — BUDESONIDE AND FORMOTEROL FUMARATE DIHYDRATE 2 PUFF: 80; 4.5 AEROSOL RESPIRATORY (INHALATION) at 06:53

## 2019-04-13 RX ADMIN — POTASSIUM CHLORIDE 40 MEQ: 1500 TABLET, EXTENDED RELEASE ORAL at 08:31

## 2019-04-13 RX ADMIN — IPRATROPIUM BROMIDE AND ALBUTEROL SULFATE 3 ML: .5; 3 SOLUTION RESPIRATORY (INHALATION) at 06:52

## 2019-04-13 RX ADMIN — DOXYCYCLINE 100 MG: 100 TABLET, FILM COATED ORAL at 05:38

## 2019-04-13 RX ADMIN — AMOXICILLIN AND CLAVULANATE POTASSIUM 1 TABLET: 875; 125 TABLET, FILM COATED ORAL at 10:58

## 2019-04-13 RX ADMIN — SENNOSIDES,DOCUSATE SODIUM 2 TABLET: 8.6; 5 TABLET, FILM COATED ORAL at 05:39

## 2019-04-13 NOTE — DISCHARGE SUMMARY
Discharge Summary    CHIEF COMPLAINT ON ADMISSION  Chief Complaint   Patient presents with   • Shortness of Breath     audible wheezes, unrelieved with inhaler       Reason for Admission  sob     Admission Date  4/11/2019    CODE STATUS  Full    HPI & HOSPITAL COURSE  This is a 38 y.o. female with history of moderate persistent asthma here with complaints of shortness of breath, wheezes, resistant to inhaler.  Patient was admitted to the hospital for asthma exacerbation and acute respiratory failure.  She was treated with antibiotics steroids and bronchodilators with improvement of breathing function and resolution of respiratory failure.  She was discharged home in stable condition with supply of steroids and antibiotics.       Therefore, she is discharged in good and stable condition to home with close outpatient follow-up.    The patient met 2-midnight criteria for an inpatient stay at the time of discharge.    Discharge Date  4/13/2019    FOLLOW UP ITEMS POST DISCHARGE  PCP  Pulmonary    DISCHARGE DIAGNOSES  Principal Problem:    Acute respiratory failure with hypoxia with asthma exacerbation (HCC) POA: Unknown  Active Problems:    Moderate persistent asthma with acute exacerbation POA: Unknown    Murmur POA: Unknown    Hyperglycemia POA: Unknown  Resolved Problems:    * No resolved hospital problems. *      FOLLOW UP  No future appointments.  [address] [address]  [phone]    Schedule an appointment as soon as possible for a visit        MEDICATIONS ON DISCHARGE     Medication List      START taking these medications      Instructions   albuterol 108 (90 Base) MCG/ACT Aers inhalation aerosol   Inhale 2 Puffs by mouth every 6 hours as needed for Shortness of Breath.  Dose:  2 Puff     amoxicillin-clavulanate 875-125 MG Tabs  Commonly known as:  AUGMENTIN   Take 1 Tab by mouth every 12 hours for 5 days.  Dose:  1 Tab     doxycycline monohydrate 100 MG  tablet  Commonly known as:  ADOXA   Take 1 Tab by mouth every 12 hours for 5 days.  Dose:  100 mg     predniSONE 20 MG Tabs  Commonly known as:  DELTASONE   Take 2 Tabs by mouth every day for 5 days.  Dose:  40 mg            Allergies  No Known Allergies    DIET  No orders of the defined types were placed in this encounter.      ACTIVITY  As tolerated.  Weight bearing as tolerated    CONSULTATIONS  None    PROCEDURES  None    LABORATORY  Lab Results   Component Value Date    SODIUM 140 04/13/2019    POTASSIUM 3.5 (L) 04/13/2019    CHLORIDE 104 04/13/2019    CO2 25 04/13/2019    GLUCOSE 103 (H) 04/13/2019    BUN 17 04/13/2019    CREATININE 0.62 04/13/2019        Lab Results   Component Value Date    WBC 13.7 (H) 04/13/2019    HEMOGLOBIN 13.8 04/13/2019    HEMATOCRIT 42.3 04/13/2019    PLATELETCT 301 04/13/2019      EC-ECHOCARDIOGRAM COMPLETE W/O CONT   Final Result      DX-CHEST-PORTABLE (1 VIEW)   Final Result      No acute cardiopulmonary disease evident.            Total time of the discharge process exceeds 46 minutes.

## 2019-04-13 NOTE — PROGRESS NOTES
AVS reviewed with pt and daughter. All questions answered. Pt to  medications at pharmacy. Pt declined transport.

## 2019-04-13 NOTE — PROGRESS NOTES
Assumed care of pt at shift change. A/Ox4. Pt understands a little English. Family at bedside, refused language line so far. discussed plan of care. Pt on room air with Q2 above 90. Denieed SOB   All needs met at this time. Bed in lowest position, treaded socks on, personal belongings and call light within reach, instructed to call for any assistance, hourly rounding in place.

## 2019-04-13 NOTE — DISCHARGE INSTRUCTIONS
Asma - Adultos  (Asthma, Adult)  El asma es zabrina enfermedad de los pulmones en la que las vías respiratorias se estrechan y obstruyen. El asma puede causar dificultad para respirar. El asma no puede curarse, eamon los medicamentos y los cambios en el estilo de salo pueden ayudar a controlarla. Los siguientes factores pueden iniciar (desencadenar) el asma:  · Escamas de la piel de los animales (caspa).  · Polvo.  · Cucarachas.  · Polen.  · Moho.  · Humo.  · Productos de limpieza.  · Aerosoles para el chong.  · Vapores de pintura u olores britany.  · Aire frío, cambios climáticos y vientos.  · Llanto o mony intensos.  · Estrés.  · Ciertos medicamentos o drogas.  · Alimentos, gloria frutas secas, mary fritas y vinos espumantes.  · Infecciones o afecciones (resfríos, gripe).  · Jacquie actividad física.  · Ciertas enfermedades o afecciones.  · Ejercicio o actividades extenuantes.  CUIDADOS EN EL HOGAR  · [place] los medicamentos gloria le indicó el médico.  · Use un medidor de flujo espiratorio janes gloria le indicó jacobs médico. El medidor de flujo espiratorio janes es zabrina herramienta que mide el funcionamiento de los pulmones.  · Anote y lleve un registro de los valores del medidor de flujo espiratorio janes.  · Conozca el plan de acción para el asma y úselo. El plan de acción para el asma es zabrina planificación por escrito para el control y tratamiento de olesya crisis asmáticas.  · Para prevenir las crisis asmáticas:  ¨ No fume. Aléjese del humo de otros fumadores.  ¨ Cambie el filtro de la calefacción y del aire acondicionado con frecuencia.  ¨ Limite el uso de hogares o estufas a leña.  ¨ Elimine las plagas (gloria cucarachas, ratones) y olesya excrementos.  ¨ Elimine las plantas si observa moho en ellas.  ¨ Limpie los pisos. Elimine el polvo regularmente. Use productos de limpieza que yola inoloros.  ¨ Pídale a alguien que pase la aspiradora cuando usted no se encuentre en casa. Utilice zabrina aspiradora con filtros HEPA, siempre que  le sea posible.  ¨ Reemplace las alfombras por pisos de mehta, baldosas o vinilo. Las alfombras pueden retener las escamas de la piel de los animales y el polvo.  ¨ Use almohadas, mantas y cubre colchones antialérgicos.  ¨ Lave las sábanas y las mantas todas las semanas con [place] y séquelas con aire caliente.  ¨ Use mantas de poliéster o algodón.  ¨ Limpie freddy y cocinas con lavandina. Si fuera posible, pídale a alguien que vuelva a pintar las yun de estas habitaciones con zabrina pintura resistente a los hongos. Aléjese de las habitaciones que se están limpiando y pintando.  ¨ Lávese las eyal con frecuencia.  SOLICITE AYUDA SI:  · Hace un silbido al respirar (sibilancias), tiene falta de aire o tiene tos incluso después de dalia los medicamentos para prevenir crisis.  · El moco coloreado que elimina (esputo) es más denso de lo normal.  · El moco coloreado que elimina cambia de trasparente o umana a amarillo, jose daniel, dc o sanguinolento.  · Tiene problemas causados por el medicamento que julianne, por ejemplo:  ¨ Zabrina erupción.  ¨ Picazón.  ¨ Hinchazón.  ¨ Problemas respiratorios.  · Necesita un medicamento de alivio más de 2 a 3 veces por semana.  · El flujo espiratorio janes aún está en 50 a 79 % del mejor valor personal después de seguir el plan de acción [place] 1 hora.  · Tiene fiebre.  SOLICITE AYUDA DE INMEDIATO SI:  · Parece empeorar y no responde al medicamento [place] zabrina crisis asmática.  · Le falta el aire, incluso en reposo.  · Le falta el aire incluso cuando hace muy poca actividad física.  · Tiene dificultad para comer, beber o hablar.  · Siente dolor en el pecho.  · La frecuencia cardíaca está acelerada.  · Dixie labios o uñas comienzan a volverse azules.  · Usted se siente mareado, débil o se desmaya.  · Pinto flujo janes es eliazar del 50 % del mejor valor personal.  Esta información no tiene gloria fin reemplazar el consejo del médico. Asegúrese de hacerle al médico cualquier pregunta que  johanne.  Document Released: 03/16/2010 Document Revised: 09/07/2016 Document Reviewed: 07/17/2014  [employer] Interactive Patient Education © 2017 [employer]    Discharge Instructions    Discharged to home by car with relative. Discharged via walking, hospital escort: Yes.  Special equipment needed: Not Applicable    Be sure to schedule a follow-up appointment with your primary care doctor or any specialists as instructed.     Discharge Plan:   Diet Plan: Discussed  Activity Level: Discussed  Confirmed Follow up Appointment: Patient to Call and Schedule Appointment  Confirmed Symptoms Management: Discussed  Medication Reconciliation Updated: Yes  Influenza Vaccine Indication: Patient Refuses    I understand that a diet low in cholesterol, fat, and sodium is recommended for good health. Unless I have been given specific instructions below for another diet, I accept this instruction as my diet prescription.   Other diet: Regular    Special Instructions: None    · Is patient discharged on Warfarin / Coumadin?   No     Depression / Suicide Risk    As you are discharged from this [facility] facility, it is important to learn how to keep safe from harming yourself.    Recognize the warning signs:  · Abrupt changes in personality, positive or negative- including increase in energy   · Giving away possessions  · Change in eating patterns- significant weight changes-  positive or negative  · Change in sleeping patterns- unable to sleep or sleeping all the time   · Unwillingness or inability to communicate  · Depression  · Unusual sadness, discouragement and loneliness  · Talk of wanting to die  · Neglect of personal appearance   · Rebelliousness- reckless behavior  · Withdrawal from people/activities they love  · Confusion- inability to concentrate     If you or a loved one observes any of these behaviors or has concerns about self-harm, here's what you can do:  · Talk about it- your feelings and reasons for harming  yourself  · Remove any means that you might use to hurt yourself (examples: pills, rope, extension cords, firearm)  · Get professional help from the community (Mental Health, Substance Abuse, psychological counseling)  · Do not be alone:Call your Safe Contact- someone whom you trust who will be there for you.  · Call your local CRISIS HOTLINE 264-1735 or 143-477-1927  · Call your local Children's Mobile Crisis Response Team [place] (239) 227-0857 or www.PingMe  · Call the toll free National Suicide Prevention Hotlines   · National Suicide Prevention Lifeline 740-426-ZBBJ (7645)  · National Hope Line Network 800-SUICIDE (738-4251)

## 2019-04-13 NOTE — FLOWSHEET NOTE
04/12/19 1726   Interdisciplinary Plan of Care-Goals (Indications)   Bronchodilator Indications History / Diagnosis   Interdisciplinary Plan of Care-Outcomes    Bronchodilator Outcome Patient at Stable Baseline   Education   Education Yes - Pt. / Family has been Instructed in use of Respiratory Equipment   RT Assessment of Delivered Medications   Evaluation of Medication Delivery Daily Yes-- Pt /Family has been Instructed in use of Respiratory Medications and Adverse Reactions   SVN Group   #SVN Performed Yes   Given By: Mouthpiece   Respiratory WDL   Respiratory (WDL) X   Chest Exam   Work Of Breathing / Effort Mild   Respiration 16   Pulse 74   Breath Sounds   RUL Breath Sounds Clear   RML Breath Sounds Clear   RLL Breath Sounds Diminished   PETRA Breath Sounds Clear   LLL Breath Sounds Diminished   Secretions   Cough Non Productive   Oximetry   #Pulse Oximetry (Single Determination) Yes   Oxygen   Pulse Oximetry 95 %   O2 (LPM) 0   O2 Daily Delivery Respiratory  Room Air with O2 Available

## 2019-04-13 NOTE — CARE PLAN
Problem: Respiratory:  Goal: Respiratory status will improve  Outcome: MET Date Met: 04/13/19  Pt ambulating and O2 sat at 93% on rm air.

## 2019-04-13 NOTE — PROGRESS NOTES
· Assumed care at 0645  · Pt A&O x 4, mostly Czech speaking but understands some english, family at bedside.   · Attempted to ween pt down to rm air, O2 sat's dropped to 86%. Pt remains on 1L of O2 via NC.   · Pt up ad jordy in rm and hallway, declined SCD's.   · Pt resting well otherwise, will cont to monitor.

## 2019-04-13 NOTE — CARE PLAN
Problem: Communication  Goal: The ability to communicate needs accurately and effectively will improve  Outcome: PROGRESSING AS EXPECTED  Educated pt to call for help. Pt verbalized understanding.     Problem: Respiratory:  Goal: Respiratory status will improve  Outcome: PROGRESSING AS EXPECTED  Pt oxygen level is above 90 at room air. Denied SOB.

## 2020-01-30 ENCOUNTER — APPOINTMENT (OUTPATIENT)
Dept: RADIOLOGY | Facility: MEDICAL CENTER | Age: 40
End: 2020-01-30
Payer: COMMERCIAL

## 2020-01-30 ENCOUNTER — APPOINTMENT (OUTPATIENT)
Dept: RADIOLOGY | Facility: MEDICAL CENTER | Age: 40
End: 2020-01-30
Attending: EMERGENCY MEDICINE
Payer: COMMERCIAL

## 2020-01-30 ENCOUNTER — HOSPITAL ENCOUNTER (EMERGENCY)
Facility: MEDICAL CENTER | Age: 40
End: 2020-01-31
Attending: EMERGENCY MEDICINE
Payer: COMMERCIAL

## 2020-01-30 DIAGNOSIS — N28.9 RENAL LESION: ICD-10-CM

## 2020-01-30 DIAGNOSIS — R10.9 ACUTE ABDOMINAL PAIN: ICD-10-CM

## 2020-01-30 DIAGNOSIS — R11.2 NON-INTRACTABLE VOMITING WITH NAUSEA, UNSPECIFIED VOMITING TYPE: ICD-10-CM

## 2020-01-30 LAB — EKG IMPRESSION: NORMAL

## 2020-01-30 PROCEDURE — 80053 COMPREHEN METABOLIC PANEL: CPT

## 2020-01-30 PROCEDURE — 93005 ELECTROCARDIOGRAM TRACING: CPT

## 2020-01-30 PROCEDURE — 36415 COLL VENOUS BLD VENIPUNCTURE: CPT

## 2020-01-30 PROCEDURE — 93005 ELECTROCARDIOGRAM TRACING: CPT | Performed by: EMERGENCY MEDICINE

## 2020-01-30 PROCEDURE — 84484 ASSAY OF TROPONIN QUANT: CPT

## 2020-01-30 PROCEDURE — 71045 X-RAY EXAM CHEST 1 VIEW: CPT

## 2020-01-30 PROCEDURE — 99285 EMERGENCY DEPT VISIT HI MDM: CPT

## 2020-01-30 PROCEDURE — 85025 COMPLETE CBC W/AUTO DIFF WBC: CPT

## 2020-01-30 PROCEDURE — 84703 CHORIONIC GONADOTROPIN ASSAY: CPT

## 2020-01-30 ASSESSMENT — LIFESTYLE VARIABLES: DO YOU DRINK ALCOHOL: NO

## 2020-01-31 ENCOUNTER — APPOINTMENT (OUTPATIENT)
Dept: RADIOLOGY | Facility: MEDICAL CENTER | Age: 40
End: 2020-01-31
Attending: EMERGENCY MEDICINE
Payer: COMMERCIAL

## 2020-01-31 VITALS
OXYGEN SATURATION: 95 % | DIASTOLIC BLOOD PRESSURE: 62 MMHG | TEMPERATURE: 98.2 F | RESPIRATION RATE: 13 BRPM | SYSTOLIC BLOOD PRESSURE: 105 MMHG | HEIGHT: 65 IN | WEIGHT: 170 LBS | HEART RATE: 65 BPM | BODY MASS INDEX: 28.32 KG/M2

## 2020-01-31 LAB
ALBUMIN SERPL BCP-MCNC: 4.3 G/DL (ref 3.2–4.9)
ALBUMIN/GLOB SERPL: 1.3 G/DL
ALP SERPL-CCNC: 75 U/L (ref 30–99)
ALT SERPL-CCNC: 11 U/L (ref 2–50)
ANION GAP SERPL CALC-SCNC: 12 MMOL/L (ref 0–11.9)
APPEARANCE UR: CLEAR
AST SERPL-CCNC: 13 U/L (ref 12–45)
BASOPHILS # BLD AUTO: 0.3 % (ref 0–1.8)
BASOPHILS # BLD: 0.05 K/UL (ref 0–0.12)
BILIRUB SERPL-MCNC: 0.5 MG/DL (ref 0.1–1.5)
BILIRUB UR QL STRIP.AUTO: NEGATIVE
BUN SERPL-MCNC: 20 MG/DL (ref 8–22)
CALCIUM SERPL-MCNC: 9.7 MG/DL (ref 8.5–10.5)
CHLORIDE SERPL-SCNC: 106 MMOL/L (ref 96–112)
CO2 SERPL-SCNC: 23 MMOL/L (ref 20–33)
COLOR UR: YELLOW
CREAT SERPL-MCNC: 0.66 MG/DL (ref 0.5–1.4)
EOSINOPHIL # BLD AUTO: 0.18 K/UL (ref 0–0.51)
EOSINOPHIL NFR BLD: 1.2 % (ref 0–6.9)
ERYTHROCYTE [DISTWIDTH] IN BLOOD BY AUTOMATED COUNT: 42.3 FL (ref 35.9–50)
GLOBULIN SER CALC-MCNC: 3.3 G/DL (ref 1.9–3.5)
GLUCOSE SERPL-MCNC: 130 MG/DL (ref 65–99)
GLUCOSE UR STRIP.AUTO-MCNC: NEGATIVE MG/DL
HCG SERPL QL: NEGATIVE
HCT VFR BLD AUTO: 44 % (ref 37–47)
HGB BLD-MCNC: 14.5 G/DL (ref 12–16)
IMM GRANULOCYTES # BLD AUTO: 0.07 K/UL (ref 0–0.11)
IMM GRANULOCYTES NFR BLD AUTO: 0.5 % (ref 0–0.9)
KETONES UR STRIP.AUTO-MCNC: NEGATIVE MG/DL
LEUKOCYTE ESTERASE UR QL STRIP.AUTO: NEGATIVE
LYMPHOCYTES # BLD AUTO: 1.42 K/UL (ref 1–4.8)
LYMPHOCYTES NFR BLD: 9.4 % (ref 22–41)
MCH RBC QN AUTO: 29.7 PG (ref 27–33)
MCHC RBC AUTO-ENTMCNC: 33 G/DL (ref 33.6–35)
MCV RBC AUTO: 90 FL (ref 81.4–97.8)
MICRO URNS: NORMAL
MONOCYTES # BLD AUTO: 0.68 K/UL (ref 0–0.85)
MONOCYTES NFR BLD AUTO: 4.5 % (ref 0–13.4)
NEUTROPHILS # BLD AUTO: 12.66 K/UL (ref 2–7.15)
NEUTROPHILS NFR BLD: 84.1 % (ref 44–72)
NITRITE UR QL STRIP.AUTO: NEGATIVE
NRBC # BLD AUTO: 0 K/UL
NRBC BLD-RTO: 0 /100 WBC
PH UR STRIP.AUTO: 6 [PH] (ref 5–8)
PLATELET # BLD AUTO: 269 K/UL (ref 164–446)
PMV BLD AUTO: 10.3 FL (ref 9–12.9)
POTASSIUM SERPL-SCNC: 3.1 MMOL/L (ref 3.6–5.5)
PROT SERPL-MCNC: 7.6 G/DL (ref 6–8.2)
PROT UR QL STRIP: NEGATIVE MG/DL
RBC # BLD AUTO: 4.89 M/UL (ref 4.2–5.4)
RBC UR QL AUTO: NEGATIVE
SODIUM SERPL-SCNC: 141 MMOL/L (ref 135–145)
SP GR UR STRIP.AUTO: >1.045
TROPONIN T SERPL-MCNC: <6 NG/L (ref 6–19)
UROBILINOGEN UR STRIP.AUTO-MCNC: 0.2 MG/DL
WBC # BLD AUTO: 15.1 K/UL (ref 4.8–10.8)

## 2020-01-31 PROCEDURE — 81003 URINALYSIS AUTO W/O SCOPE: CPT

## 2020-01-31 PROCEDURE — 74177 CT ABD & PELVIS W/CONTRAST: CPT

## 2020-01-31 PROCEDURE — 96375 TX/PRO/DX INJ NEW DRUG ADDON: CPT

## 2020-01-31 PROCEDURE — 700105 HCHG RX REV CODE 258: Performed by: EMERGENCY MEDICINE

## 2020-01-31 PROCEDURE — 700117 HCHG RX CONTRAST REV CODE 255: Performed by: EMERGENCY MEDICINE

## 2020-01-31 PROCEDURE — 96374 THER/PROPH/DIAG INJ IV PUSH: CPT

## 2020-01-31 PROCEDURE — 700111 HCHG RX REV CODE 636 W/ 250 OVERRIDE (IP): Performed by: EMERGENCY MEDICINE

## 2020-01-31 RX ORDER — MORPHINE SULFATE 4 MG/ML
4 INJECTION, SOLUTION INTRAMUSCULAR; INTRAVENOUS ONCE
Status: COMPLETED | OUTPATIENT
Start: 2020-01-31 | End: 2020-01-31

## 2020-01-31 RX ORDER — ONDANSETRON 2 MG/ML
4 INJECTION INTRAMUSCULAR; INTRAVENOUS ONCE
Status: COMPLETED | OUTPATIENT
Start: 2020-01-31 | End: 2020-01-31

## 2020-01-31 RX ORDER — ONDANSETRON 4 MG/1
4 TABLET, ORALLY DISINTEGRATING ORAL EVERY 8 HOURS PRN
Qty: 8 TAB | Refills: 0 | Status: SHIPPED | OUTPATIENT
Start: 2020-01-31 | End: 2020-01-31 | Stop reason: SDUPTHER

## 2020-01-31 RX ORDER — SODIUM CHLORIDE, SODIUM LACTATE, POTASSIUM CHLORIDE, CALCIUM CHLORIDE 600; 310; 30; 20 MG/100ML; MG/100ML; MG/100ML; MG/100ML
1000 INJECTION, SOLUTION INTRAVENOUS ONCE
Status: COMPLETED | OUTPATIENT
Start: 2020-01-31 | End: 2020-01-31

## 2020-01-31 RX ORDER — POTASSIUM CHLORIDE 20 MEQ/1
40 TABLET, EXTENDED RELEASE ORAL ONCE
Status: DISCONTINUED | OUTPATIENT
Start: 2020-01-31 | End: 2020-01-31 | Stop reason: HOSPADM

## 2020-01-31 RX ORDER — ONDANSETRON 4 MG/1
4 TABLET, ORALLY DISINTEGRATING ORAL EVERY 8 HOURS PRN
Qty: 8 TAB | Refills: 0 | Status: SHIPPED | OUTPATIENT
Start: 2020-01-31 | End: 2020-02-07

## 2020-01-31 RX ADMIN — ONDANSETRON 4 MG: 2 INJECTION INTRAMUSCULAR; INTRAVENOUS at 00:47

## 2020-01-31 RX ADMIN — MORPHINE SULFATE 4 MG: 4 INJECTION INTRAVENOUS at 00:47

## 2020-01-31 RX ADMIN — IOHEXOL 100 ML: 350 INJECTION, SOLUTION INTRAVENOUS at 01:22

## 2020-01-31 RX ADMIN — SODIUM CHLORIDE, POTASSIUM CHLORIDE, SODIUM LACTATE AND CALCIUM CHLORIDE 1000 ML: 600; 310; 30; 20 INJECTION, SOLUTION INTRAVENOUS at 00:47

## 2020-01-31 NOTE — ED NOTES
Patient returned from imaging. Patients pain is improved 1/10, still reports nausea but mild at this time.

## 2020-01-31 NOTE — ED NOTES
Patient reports that she had sudden onset mid sternal chest heaviness lasting 5 minutes with bilateral arm numbness, then with dizziness and 5 episodes of vomiting. No chest pain or heaviness now, but reporting new epigastric pain

## 2020-01-31 NOTE — DISCHARGE INSTRUCTIONS
Please discuss the following findings with your regular doctor:  CT-ABDOMEN-PELVIS WITH   Final Result      1.  No evidence of abdominal or pelvic mass, adenopathy, or inflammatory change.   2.  Small hypodense RIGHT renal lesion, too small to characterize but most likely a cyst. No follow up imaging is recommended per consensus guidelines of the 2019 ACR Incidental Findings Committee for probably benign incidental simple appearing renal    cystic lesion(s) based on imaging criteria.               DX-CHEST-PORTABLE (1 VIEW)   Final Result      No acute cardiac or pulmonary abnormalities are identified.

## 2020-01-31 NOTE — ED PROVIDER NOTES
ED Provider Note    Scribed for Nitin Lucero M.D. by Natalie Huerta. 1/31/2020  12:00 AM    Primary care provider:  None noted  Means of arrival: Walk-in  History obtained from: Patient  History limited by: None    CHIEF COMPLAINT  Chief Complaint   Patient presents with   • Chest Pain   • N/V       HPI  Quincy Vela is a 39 y.o. female who presents to the Emergency Department for acute, constant, moderate left lower quadrant and epigastric abdominal pain that began prior to arrival and has not resolved since onset. The patient reports that she was at her baseline for the majority of the day but then developed sudden nausea and non-bloody vomiting about thirty minutes prior to her ED arrival. She has had a total of six episodes of emesis since onset.     Shortly after the onset of her nausea and vomiting, the patient reports that she developed chills, numbness to the bilateral upper extremities and an episodes of moderate chest pain that she described as pressurized in nature, all of these sx resolved except for nausea.     She notes that her episodes of chest pain and bilateral upper extremities numbness lasted about five minutes and has resolved with duration and without intervention. The patient notes that since arriving to the ED she has now developed an intermittent headache and acute, constant left lower quadrant and epigastric pain which have been progressively worsening sine onset. No exacerbating or alleviating factors were reported. She does not report taking any over the counter medications for her current symptoms.The patient denies recent symptoms of fevers, painful urination, vaginal discharge or diarrhea. She notes that she has never experienced similar symptoms in the past but does report that she was last seen at this facility in May of 2019 and was admitted for about two days for shortness of breath.. Reported past medical history includes Asthma for which she takes Albuterol for.  "      REVIEW OF SYSTEMS  Pertinent positives include: resolved nausea, resolved vomiting, resolved chest pain, resolved bilateral upper extremity numbness, chills, intermittent headache, left lower quadrant abdominal pain, epigastric pain. Pertinent negatives include: fevers, painful urination, vaginal discharge or diarrhea. See history of present illness. All other systems are negative.     PAST MEDICAL HISTORY   has a past medical history of AMA (advanced maternal age) multigravida 35+ (7/19/2016) and Asthma.    SURGICAL HISTORY  patient denies any surgical history    SOCIAL HISTORY  Social History     Tobacco Use   • Smoking status: Never Smoker   • Smokeless tobacco: Never Used   Substance Use Topics   • Alcohol use: No   • Drug use: No      Social History     Substance and Sexual Activity   Drug Use No       FAMILY HISTORY  Family History   Problem Relation Age of Onset   • Diabetes Mother         takes meds       CURRENT MEDICATIONS  Home Medications     Reviewed by Halina Peterson R.N. (Registered Nurse) on 01/30/20 at 2250  Med List Status: Complete   Medication Last Dose Status   albuterol 108 (90 Base) MCG/ACT Aero Soln inhalation aerosol  Active                ALLERGIES  No Known Allergies    PHYSICAL EXAM  VITAL SIGNS: /75   Pulse 84   Temp 36.8 °C (98.2 °F) (Temporal)   Resp 16   Ht 1.651 m (5' 5\")   Wt 77.1 kg (170 lb)   LMP 01/05/2020 (Exact Date)   SpO2 96%   BMI 28.29 kg/m²     Constitutional: Alert.  HENT: No signs of trauma, Bilateral external ears normal, Nose normal. Uvula midline.   Eyes: Pupils are equal and reactive, Conjunctiva normal, Non-icteric.   Neck: Normal range of motion, No tenderness, Supple, No stridor.   Lymphatic: No lymphadenopathy noted.   Cardiovascular: Regular rate and rhythm, no murmurs.   Thorax & Lungs: Normal breath sounds, No respiratory distress, No wheezing, No chest tenderness.   Abdomen:  Soft, Left lower quadrant abdominal and epigastric " tenderness to palpation. No peritoneal signs, No masses, No pulsatile masses.   Skin: Warm, Dry, No erythema, No rash.   Back: No bony tenderness, No CVA tenderness.   Extremities: Intact distal pulses, No edema, No tenderness, No cyanosis.  Musculoskeletal: Good range of motion in all major joints. No tenderness to palpation or major deformities noted.   Neurologic: Alert , Normal motor function, Normal sensory function, No focal deficits noted.   Psychiatric: Affect normal, Judgment normal, Mood normal.       DIAGNOSTIC STUDIES / PROCEDURES    LABS  Labs Reviewed   CBC WITH DIFFERENTIAL - Abnormal; Notable for the following components:       Result Value    WBC 15.1 (*)     MCHC 33.0 (*)     Neutrophils-Polys 84.10 (*)     Lymphocytes 9.40 (*)     Neutrophils (Absolute) 12.66 (*)     All other components within normal limits   COMP METABOLIC PANEL - Abnormal; Notable for the following components:    Potassium 3.1 (*)     Anion Gap 12.0 (*)     Glucose 130 (*)     All other components within normal limits   TROPONIN   HCG QUAL SERUM   URINALYSIS,CULTURE IF INDICATED   ESTIMATED GFR      All labs reviewed by me.    EKG  12 Lead EKG interpreted by me to show:  Indication: Chest Pain  Normal sinus rhythm  Rate 75   Axis: Normal  Intervals: Normal  Normal T waves  Normal ST segments  My impression of this EKG: No STEMI.     RADIOLOGY  CT-ABDOMEN-PELVIS WITH   Final Result      1.  No evidence of abdominal or pelvic mass, adenopathy, or inflammatory change.   2.  Small hypodense RIGHT renal lesion, too small to characterize but most likely a cyst. No follow up imaging is recommended per consensus guidelines of the 2019 ACR Incidental Findings Committee for probably benign incidental simple appearing renal    cystic lesion(s) based on imaging criteria.               DX-CHEST-PORTABLE (1 VIEW)   Final Result      No acute cardiac or pulmonary abnormalities are identified.        The radiologist's interpretation of all  radiological studies have been reviewed by me.    COURSE & MEDICAL DECISION MAKING  Nursing notes, VS, PMSFHx reviewed in chart.    39 y.o. female p/w chief complaint of  acute, constant, moderate left lower quadrant and epigastric abdominal pain that began prior to arrival after experiencing nausea, vomiting, bilateral upper extremity numbness and episode of chest pain    #abdominal pain   CT scan of abdomen ordered in order to rule out diverticulitis   No RLQ pain, TTP or fever to suggest appendicitis  Positive LLQ pain with palpation to suggest diverticulitis, however the patient is afebrile  No pain at of proportion to suggest mesenteric ischemia  No e/o rash or zoster  CT with incidental finding reported above  No e/o UTI  No elevation in lipase to suggest pancreatitis  ECG unremarkable, troponin remarkable, x-ray of chest unremarkable.  Heart score 0        11:09 PM In triage, EKG, troponin, CMP, CBC with differential were ordered    11:40 PM Ordered estimated GFR    12:00 AM Patient was seen and evaluated at bedside. Patient presents to the ED for the above complaint.  The patient is afebrile, well-appearing with left lower quadrant abdominal and epigastric tenderness to palpation but with an otherwise normal physical exam with stable vital signs. I informed the patient that I have reviewed her EKG which is normal and does now show any arrhthymias or signs of ischemia.  Discussed plan of care with patient which includes medicating the patient for her symptoms, ordering lab work and imaging results for further evaluation of her condition. Patient's verbalizes their understanding and agreement to the plan of care. Ordered UA culture, beta-hCG qual serum and CT-abdomen-pelvis. Patient was medicated with lactated ringers infusion , Zofran 4 mg for her nausea, Morphine 4 mg for her abdominal pain    HYDRATION: Based on the patient's presentation of Acute Vomiting the patient was given IV fluids. IV Hydration was  used because oral hydration was not adequate alone. Upon recheck following hydration, the patient was improved.    12:24 AM Reviewed the patient's lab work, which is consistent with mild hypokalemia. Patient was medicated with one tablet of potassium chloride 40 mEq for her hypokalemia.     2:44 AM Recheck. I informed the patient that her lab work and imaging results were normal with no major concerning findings. There is no evidence of a urinary tract infection or elevation in her lipase. However the CT of her abdomen did show a small hypodense right renal lesion. The patient was instructed to inform the patient about her current ED visit and imaging results to have a repeat CT scan performed. She reports that she is no longer feeling nauseated.  At this point, the etiology of the patient's symptoms is not entirely clear. Their history and physical are not concerning for cholecystitis. Clinically, the patient is not dehydrated nor does the patient have evidence of a surgical abdomen.  A definitive diagnoses cannot be made at this time. I counseled the patient to be rechecked in 12-24 hours by primary physician or in the ER if this is not possible, unless they are feeling completely better. He was informed that he is stable for discharge and was instructed to follow with Gastroenterology and their primary care provider for further evaluation of her condition. At this time, the patient has stable vital signs and is stable for discharge. They were given discharge instructions which include taking Tylenol for pain/fever control, getting plenty of rest, drinking copious amounts of water, maintaining a balance diet and following up their primary care provider. She was given a prescription for Zofran 4 mg which is to be taken every eight hours as needed for nausea and vomiting. The patient is to return immediately for increasing pain, localization of pain in the right lower quadrant or right upper quadrant, vomiting not  controlled by medications, fever, looking or feeling very ill, or concern. Patient is given aftercare instructions on abdominal pain of unclear etiology. They were instructed to immediately return to the ED if their symptoms worsens. Patient verbalizes their understanding and agreement to plan of discharge.        The patient will return for new or worsening symptoms and is stable at the time of discharge.    DISPOSITION:  Patient will be discharged home in stable condition.    FOLLOW UP:  [facility], Emergency Dept  [address] [address] [phone]    If symptoms worsen    [facility]  [address] [place] [id number]  [phone]  In 3 days        OUTPATIENT MEDICATIONS:  Current Discharge Medication List      START taking these medications    Details   ondansetron (ZOFRAN ODT) 4 MG TABLET DISPERSIBLE Take 1 Tab by mouth every 8 hours as needed for Nausea for up to 7 days.  Qty: 8 Tab, Refills: 0               FINAL IMPRESSION  1. Acute abdominal pain    2. Non-intractable vomiting with nausea, unspecified vomiting type    3. Renal lesion          Natalie LORENZ (Luci), am scribing for, and in the presence of, Nitin Lucero M.D..    Electronically signed by: Natalie Huerta (Luci), 1/31/2020    Nitin LORENZ M.D. personally performed the services described in this documentation, as scribed by Natalie Huerta in my presence, and it is both accurate and complete.    C    The note accurately reflects work and decisions made by me.  Nitin Lucero M.D.  1/31/2020  6:32 AM

## 2020-01-31 NOTE — ED TRIAGE NOTES
".  Chief Complaint   Patient presents with   • Chest Pain   • N/V      Pt ambulate to triage with above complaint. Pt is [place] speaking with  as interpretor. Pt C/O N/V at home about 30 minutes ago with chills and numbness in both hands. Pt states the chest pain started after she began vomiting.     Pt C/O chest pain earlier tonight, \"it was hard to breathe and felt like a pressure.\"    Pt C/O mid epigastric pain now with nausea. Pt does not appear distressed at this time. Pt educated on triage process and returned to [facility]. Pt will inform staff if condition changes.      "

## 2020-01-31 NOTE — ED NOTES
Assist rn    Pt ambulatory to bathroom w/ steady gait. Pt informs that she is still slightly nauseated, but a lot better than before.

## 2021-01-16 ENCOUNTER — OFFICE VISIT (OUTPATIENT)
Dept: URGENT CARE | Facility: PHYSICIAN GROUP | Age: 41
End: 2021-01-16
Payer: COMMERCIAL

## 2021-01-16 ENCOUNTER — HOSPITAL ENCOUNTER (OUTPATIENT)
Facility: MEDICAL CENTER | Age: 41
End: 2021-01-16
Attending: PHYSICIAN ASSISTANT
Payer: COMMERCIAL

## 2021-01-16 VITALS
DIASTOLIC BLOOD PRESSURE: 80 MMHG | WEIGHT: 174 LBS | OXYGEN SATURATION: 98 % | TEMPERATURE: 98.7 F | RESPIRATION RATE: 18 BRPM | SYSTOLIC BLOOD PRESSURE: 120 MMHG | HEART RATE: 70 BPM | HEIGHT: 65 IN | BODY MASS INDEX: 28.99 KG/M2

## 2021-01-16 DIAGNOSIS — M54.50 ACUTE BILATERAL LOW BACK PAIN WITHOUT SCIATICA: ICD-10-CM

## 2021-01-16 LAB
APPEARANCE UR: CLEAR
BILIRUB UR STRIP-MCNC: NORMAL MG/DL
COLOR UR AUTO: YELLOW
GLUCOSE UR STRIP.AUTO-MCNC: NORMAL MG/DL
KETONES UR STRIP.AUTO-MCNC: NORMAL MG/DL
LEUKOCYTE ESTERASE UR QL STRIP.AUTO: NORMAL
NITRITE UR QL STRIP.AUTO: NORMAL
PH UR STRIP.AUTO: 5.5 [PH] (ref 5–8)
PROT UR QL STRIP: NORMAL MG/DL
RBC UR QL AUTO: NORMAL
SP GR UR STRIP.AUTO: 1.02
UROBILINOGEN UR STRIP-MCNC: 0.2 MG/DL

## 2021-01-16 PROCEDURE — 81002 URINALYSIS NONAUTO W/O SCOPE: CPT | Performed by: PHYSICIAN ASSISTANT

## 2021-01-16 PROCEDURE — 87086 URINE CULTURE/COLONY COUNT: CPT

## 2021-01-16 PROCEDURE — 99203 OFFICE O/P NEW LOW 30 MIN: CPT | Performed by: PHYSICIAN ASSISTANT

## 2021-01-16 ASSESSMENT — ENCOUNTER SYMPTOMS
NAUSEA: 0
PARESTHESIAS: 0
BOWEL INCONTINENCE: 0
EYE REDNESS: 0
FEVER: 0
LEG PAIN: 0
VOMITING: 0
COUGH: 0
NUMBNESS: 0
BACK PAIN: 1
WEAKNESS: 0
SHORTNESS OF BREATH: 0
TINGLING: 0
PERIANAL NUMBNESS: 0
EYE DISCHARGE: 0

## 2021-01-16 ASSESSMENT — FIBROSIS 4 INDEX: FIB4 SCORE: 0.61

## 2021-01-16 NOTE — PROGRESS NOTES
Subjective:      Quincy Vela is a 40 y.o. female who presents with Pain (started lasy week with pain on her back, middle back. the pain is now all over her back. she takes 500mg tylenol and it doesn't. )        Back Pain  This is a new problem. Episode onset: x 1 week ago. The problem occurs constantly. The pain is present in the lumbar spine. The quality of the pain is described as aching. Radiates to: The patient reports radiation of pain to her mid and upper back. She reports no radiation of pain to her lower extremities. The pain is mild. The symptoms are aggravated by position. Pertinent negatives include no bladder incontinence, bowel incontinence, chest pain, dysuria, fever, leg pain, numbness, paresthesias, perianal numbness, tingling or weakness. Treatments tried: OTC Tylenol.     The patient reports no injury or trauma to the back.     The patient reports no history of kidney stones.     PMH:  has a past medical history of AMA (advanced maternal age) multigravida 35+ (7/19/2016) and Asthma.  MEDS:   Current Outpatient Medications:   •  albuterol 108 (90 Base) MCG/ACT Aero Soln inhalation aerosol, Inhale 2 Puffs by mouth every 6 hours as needed for Shortness of Breath. (Patient not taking: Reported on 1/16/2021), Disp: 8.5 g, Rfl: 1  ALLERGIES: No Known Allergies  SURGHX: No past surgical history on file.  SOCHX:  reports that she has never smoked. She has never used smokeless tobacco. She reports that she does not drink alcohol or use drugs.  FH: Family history was reviewed, no pertinent findings to report    Review of Systems   Constitutional: Negative for fever.   HENT: Negative for congestion.    Eyes: Negative for discharge and redness.   Respiratory: Negative for cough and shortness of breath.    Cardiovascular: Negative for chest pain and leg swelling.   Gastrointestinal: Negative for bowel incontinence, nausea and vomiting.   Genitourinary: Negative for bladder incontinence, dysuria  "and hematuria.   Musculoskeletal: Positive for back pain.   Skin: Negative for rash.   Neurological: Negative for tingling, weakness, numbness and paresthesias.   All other systems reviewed and are negative.         Objective:     /80   Pulse 70   Temp 37.1 °C (98.7 °F) (Temporal)   Resp 18   Ht 1.651 m (5' 5\")   Wt 78.9 kg (174 lb)   SpO2 98%   BMI 28.96 kg/m²      Physical Exam  Constitutional:       General: She is not in acute distress.     Appearance: Normal appearance. She is not ill-appearing.   HENT:      Head: Normocephalic and atraumatic.      Right Ear: External ear normal.      Left Ear: External ear normal.   Eyes:      Extraocular Movements: Extraocular movements intact.      Conjunctiva/sclera: Conjunctivae normal.   Neck:      Musculoskeletal: Normal range of motion and neck supple.   Cardiovascular:      Rate and Rhythm: Normal rate and regular rhythm.      Heart sounds: Normal heart sounds.   Pulmonary:      Effort: Pulmonary effort is normal. No respiratory distress.      Breath sounds: Normal breath sounds. No wheezing.   Abdominal:      Palpations: Abdomen is soft.      Tenderness: There is no abdominal tenderness. There is no right CVA tenderness or left CVA tenderness.   Musculoskeletal:      Comments:   Lumbar Spine:  Tenderness to the bilateral paraspinal region of the lumbar spine.  No midline/bony tenderness.  No palpable step-off.  No swelling.  No ecchymosis.  No erythema.  ROM intact  Neurovascular intact  Strength 5/5 -equal bilateral lower extremities  Normal gait   Skin:     General: Skin is warm and dry.   Neurological:      Mental Status: She is alert and oriented to person, place, and time.            Progress:  Results for orders placed or performed in visit on 01/16/21   POCT Urinalysis   Result Value Ref Range    POC Color yellow Negative    POC Appearance clear Negative    POC Leukocyte Esterase neg Negative    POC Nitrites neg Negative    POC Urobiligen 0.2 " Negative (0.2) mg/dL    POC Protein neg Negative mg/dL    POC Urine PH 5.5 5.0 - 8.0    POC Blood small Negative    POC Specific Gravity 1.025 <1.005 - >1.030    POC Ketones neg Negative mg/dL    POC Bilirubin neg Negative mg/dL    POC Glucose neg Negative mg/dL       Urine Culture - pending      Assessment/Plan:        1. Acute bilateral low back pain without sciatica  - POCT Urinalysis  - URINE CULTURE(NEW); Future    The patient's presenting symptoms and physical exam findings are consistent with acute bilateral low back pain without sciatica.  The patient reports no injury or trauma to her back.  On physical exam, the patient had tenderness to the bilateral paraspinal region of the lumbar spine.  No midline/bony tenderness or palpable step-off was appreciated.  The remainder the patient physical exam today in clinic was normal.  No focal neurological deficits were appreciated.  The patient appears in no acute distress.  The patient's vital signs are stable and within normal limits.  She is afebrile today in clinic.  The patient's low back pain is likely related to an acute muscle strain.  The patient's urinalysis today in clinic showed small blood without leukocyte esterace.  The patient reports no history of kidney stones.  No CVA tenderness was appreciated on exam.  Additionally, the patient is currently not experiencing any dysuria, urinary frequency, or urinary urgency.  The patient reports no hematuria.  Based on the patient's presenting symptoms and physical exam findings, it is unlikely the patient's symptoms are due to an acute urinary tract infection.  Will culture the patient's urine to rule out possible bacterial source.  Additionally, it is unlikely patient symptoms are due to an acute kidney stone.  Recommend OTC medications and supportive care for symptomatic management.  Recommend patient follow-up with her PCP as needed.  Discussed return precautions with the patient, and she verbalized  understanding.    Differential diagnoses, supportive care, and indications for immediate follow-up discussed with patient.   Instructed to return to clinic or nearest emergency department for any change in condition, further concerns, or worsening of symptoms.    OTC NSAIDs for pain/discomfort  Alternate ice and heat to the affected area for symptomatic relief  Home stretches as discussed in clinic  Follow-up with PCP  Return to clinic or go to the ED if symptoms worsen or fail to improve, or if the patient should develop worsening/increasing back pain, radiation of pain, numbness, tingling, or weakness to the lower extremities, incontinence of bladder/bowel, paresthesias, difficulty walking, fever/chills, and/or any concerning symptoms.     Discussed plan with the patient, and she agrees to the above.     Please note that this dictation was created using voice recognition software. I have made every reasonable attempt to correct obvious errors, but I expect that there may be errors of grammar and possibly content that I did not discover before finalizing the note.

## 2021-01-17 DIAGNOSIS — M54.50 ACUTE BILATERAL LOW BACK PAIN WITHOUT SCIATICA: ICD-10-CM

## 2021-01-19 LAB
BACTERIA UR CULT: NORMAL
SIGNIFICANT IND 70042: NORMAL
SITE SITE: NORMAL
SOURCE SOURCE: NORMAL

## 2022-05-25 ENCOUNTER — OFFICE VISIT (OUTPATIENT)
Dept: URGENT CARE | Facility: CLINIC | Age: 42
End: 2022-05-25
Payer: COMMERCIAL

## 2022-05-25 ENCOUNTER — APPOINTMENT (OUTPATIENT)
Dept: RADIOLOGY | Facility: IMAGING CENTER | Age: 42
End: 2022-05-25
Attending: NURSE PRACTITIONER
Payer: COMMERCIAL

## 2022-05-25 VITALS
DIASTOLIC BLOOD PRESSURE: 58 MMHG | TEMPERATURE: 97.6 F | HEIGHT: 64 IN | SYSTOLIC BLOOD PRESSURE: 112 MMHG | BODY MASS INDEX: 30.22 KG/M2 | WEIGHT: 177 LBS | HEART RATE: 86 BPM | OXYGEN SATURATION: 99 % | RESPIRATION RATE: 20 BRPM

## 2022-05-25 DIAGNOSIS — R06.02 SOB (SHORTNESS OF BREATH): ICD-10-CM

## 2022-05-25 DIAGNOSIS — M94.0 COSTOCHONDRITIS, ACUTE: ICD-10-CM

## 2022-05-25 DIAGNOSIS — R07.9 CHEST PAIN, UNSPECIFIED TYPE: ICD-10-CM

## 2022-05-25 PROBLEM — J45.909 ASTHMA WITHOUT STATUS ASTHMATICUS: Status: ACTIVE | Noted: 2022-05-25

## 2022-05-25 PROBLEM — K64.9 HEMORRHOIDS: Status: ACTIVE | Noted: 2019-03-22

## 2022-05-25 PROBLEM — E55.9 VITAMIN D DEFICIENCY: Status: ACTIVE | Noted: 2022-05-25

## 2022-05-25 PROBLEM — E66.9 OBESITY: Status: ACTIVE | Noted: 2019-06-04

## 2022-05-25 PROCEDURE — 99214 OFFICE O/P EST MOD 30 MIN: CPT | Performed by: NURSE PRACTITIONER

## 2022-05-25 PROCEDURE — 71046 X-RAY EXAM CHEST 2 VIEWS: CPT | Mod: TC | Performed by: PHYSICIAN ASSISTANT

## 2022-05-25 RX ORDER — NAPROXEN 500 MG/1
500 TABLET ORAL EVERY 12 HOURS PRN
Qty: 30 TABLET | Refills: 0 | Status: SHIPPED | OUTPATIENT
Start: 2022-05-25

## 2022-05-25 ASSESSMENT — ENCOUNTER SYMPTOMS
COUGH: 0
FEVER: 0
SHORTNESS OF BREATH: 1
CONSTITUTIONAL NEGATIVE: 1

## 2022-05-25 ASSESSMENT — VISUAL ACUITY: OU: 1

## 2022-05-26 NOTE — PROGRESS NOTES
Subjective:     Quincy Vela is a 41 y.o. female who presents for Shortness of Breath (Fatigue, shortness of breath and chest pain while going up stairs, currently has chest pain, chest pain comes and goes x 3 days)       Shortness of Breath  This is a new problem. The problem has been gradually worsening. Associated symptoms include chest pain. Pertinent negatives include no fever.     Patient brought in by her  who is helping to translate in [place].    For the past 3 days, patient has had middle to left sided chest pain and tenderness.  Pain does not radiate.  Also feels short of breath.  Symptoms worsen with exertion.  Works a physical job.  History of asthma.  Not using her inhaler recently.  Denies history of cardiovascular disease.  Denies fever, cough, or other symptoms.  1 month ago, had similar symptoms for 1 whole day while she was in [place].  Resolved the day afterwards.  At the time, took NSAIDs which helped only a little bit with the pain.    Patient was screened prior to rooming and denied COVID-19 diagnosis or contact with a person who has been diagnosed or is suspected to have COVID-19. During this visit, appropriate PPE was worn, hand hygiene was performed, and the patient and any visitors were masked.     PMH:  has a past medical history of AMA (advanced maternal age) multigravida 35+ (7/19/2016) and Asthma.    MEDS:   Current Outpatient Medications:   •  naproxen (NAPROSYN) 500 MG Tab, Take 1 Tablet by mouth every 12 hours as needed (Pain/inflammation)., Disp: 30 Tablet, Rfl: 0  •  albuterol 108 (90 Base) MCG/ACT Aero Soln inhalation aerosol, Inhale 2 Puffs by mouth every 6 hours as needed for Shortness of Breath., Disp: 8.5 g, Rfl: 1    ALLERGIES: No Known Allergies    SURGHX: History reviewed. No pertinent surgical history.    SOCHX:  reports that she has never smoked. She has never used smokeless tobacco. She reports current alcohol use. She reports that she does not  "use drugs.     FH: Reviewed with patient, not pertinent to this visit.    Review of Systems   Constitutional: Negative.  Negative for fever and malaise/fatigue.   Respiratory: Positive for shortness of breath. Negative for cough.    Cardiovascular: Positive for chest pain.   All other systems reviewed and are negative.    Additional details per HPI.      Objective:     /58 (BP Location: Right arm, Patient Position: Sitting, BP Cuff Size: Adult)   Pulse 86   Temp 36.4 °C (97.6 °F) (Temporal)   Resp 20   Ht 1.626 m (5' 4\")   Wt 80.3 kg (177 lb)   SpO2 99%   BMI 30.38 kg/m²     Physical Exam  Vitals reviewed.   Constitutional:       General: She is not in acute distress.     Appearance: She is well-developed. She is not ill-appearing, toxic-appearing or diaphoretic.   Eyes:      General: Vision grossly intact.      Extraocular Movements: Extraocular movements intact.   Cardiovascular:      Rate and Rhythm: Normal rate and regular rhythm.      Heart sounds: Normal heart sounds.   Pulmonary:      Effort: Pulmonary effort is normal. No respiratory distress.      Breath sounds: Wheezing (Faint, few) present. No decreased breath sounds.   Chest:      Chest wall: Tenderness (Left costochondral joints) present.   Musculoskeletal:         General: No deformity. Normal range of motion.      Cervical back: Normal range of motion.   Skin:     General: Skin is warm and dry.      Coloration: Skin is not pale.   Neurological:      Mental Status: She is alert and oriented to person, place, and time.      Sensory: No sensory deficit.      Motor: No weakness.   Psychiatric:         Behavior: Behavior normal. Behavior is cooperative.     Chest x-ray:    Details    Reading Physician Reading Date Result Priority   Johnathan Rubio M.D.  [phone] 5/25/2022 [facility]     Narrative & Impression     5/25/2022 6:58 PM     HISTORY/REASON FOR EXAM:  Shortness of breath     TECHNIQUE/EXAM DESCRIPTION AND NUMBER OF " VIEWS:  Two views of the chest.     COMPARISON:  1/30/2020.     FINDINGS:     LUNGS: Clear. No effusions.  PNEUMOTHORAX: None.  LINES AND TUBES: None.  MEDIASTINUM: No cardiomegaly.  MUSCULOSKELETAL STRUCTURES: No acute displaced fracture.     IMPRESSION:     No acute cardiopulmonary abnormality.           Exam Ended: 05/25/22  7:05 PM Last Resulted: 05/25/22  7:12 PM           Radiology report and images reviewed by myself. Concur with findings.    EKG: sinus rhythm 62, no acute ST abnormalities      Assessment/Plan:     1. Chest pain, unspecified type  - EKG    2. SOB (shortness of breath)  - DX-CHEST-2 VIEWS; Future    3. Costochondritis, acute  - naproxen (NAPROSYN) 500 MG Tab; Take 1 Tablet by mouth every 12 hours as needed (Pain/inflammation).  Dispense: 30 Tablet; Refill: 0    Rx as above sent electronically. Patient declines steroid.    Differentials discussed. Vital signs stable, afebrile, no acute distress at this time. Chest x-ray and EKG reassuring. Warning signs reviewed. Strict return/ER precautions advised. Patient's  verbalizes understanding.    Differential diagnosis, natural history, supportive care, over-the-counter symptom management per 's instructions, close monitoring, and indications for immediate follow-up discussed.     All questions answered. Patient and  agree with the plan of care.    Discharge summary provided.

## 2024-03-30 ENCOUNTER — OFFICE VISIT (OUTPATIENT)
Dept: URGENT CARE | Facility: PHYSICIAN GROUP | Age: 44
End: 2024-03-30

## 2024-03-30 VITALS
TEMPERATURE: 98.1 F | BODY MASS INDEX: 31.62 KG/M2 | HEART RATE: 95 BPM | HEIGHT: 64 IN | OXYGEN SATURATION: 97 % | DIASTOLIC BLOOD PRESSURE: 68 MMHG | RESPIRATION RATE: 16 BRPM | SYSTOLIC BLOOD PRESSURE: 122 MMHG | WEIGHT: 185.19 LBS

## 2024-03-30 DIAGNOSIS — J03.90 EXUDATIVE TONSILLITIS: ICD-10-CM

## 2024-03-30 RX ORDER — IBUPROFEN 200 MG
200 TABLET ORAL EVERY 6 HOURS PRN
COMMUNITY

## 2024-03-30 RX ORDER — AMOXICILLIN 500 MG/1
1000 CAPSULE ORAL DAILY
Qty: 20 CAPSULE | Refills: 0 | Status: SHIPPED | OUTPATIENT
Start: 2024-03-30 | End: 2024-04-09

## 2024-03-30 NOTE — PROGRESS NOTES
"Subjective:   Quincy Vela is a 43 y.o. female who presents for Sore Throat (Bodyaches X 3 )      HPI:  43-year-old female presents to the urgent care for 3 days of sore throat, swollen tonsils, intermittent headache, and bodyaches.  No one else at home with similar symptoms.  No fever, nausea, vomiting, chest pain, shortness of breath.  Using ibuprofen and Tylenol without much relief in her sore throat.      Medications:    albuterol Aers  amoxicillin Caps  ibuprofen Tabs  naproxen Tabs    Allergies: Patient has no known allergies.    Problem List: Quincy Vela does not have any pertinent problems on file.    Surgical History:  No past surgical history on file.    Past Social Hx: Quincy Vela  reports that she has never smoked. She has never used smokeless tobacco. She reports current alcohol use. She reports that she does not use drugs.     Past Family Hx:  Quincy Vela family history includes Diabetes in her mother.     Problem list, medications, and allergies reviewed by myself today in Epic.     Objective:     /68   Pulse 95   Temp 36.7 °C (98.1 °F) (Temporal)   Resp 16   Ht 1.613 m (5' 3.5\")   Wt 84 kg (185 lb 3 oz)   SpO2 97%   BMI 32.29 kg/m²     Physical Exam  Vitals reviewed.   HENT:      Right Ear: Tympanic membrane, ear canal and external ear normal.      Left Ear: Tympanic membrane, ear canal and external ear normal.      Nose: Nose normal.      Mouth/Throat:      Mouth: Mucous membranes are moist.      Pharynx: Posterior oropharyngeal erythema present. No oropharyngeal exudate.      Tonsils: Tonsillar exudate present. 2+ on the right. 2+ on the left.   Cardiovascular:      Rate and Rhythm: Normal rate and regular rhythm.      Pulses: Normal pulses.      Heart sounds: Normal heart sounds. No murmur heard.  Pulmonary:      Effort: Pulmonary effort is normal. No respiratory distress.      Breath sounds: Normal breath sounds. No " stridor. No wheezing, rhonchi or rales.   Musculoskeletal:      Cervical back: Normal range of motion.   Lymphadenopathy:      Cervical: Cervical adenopathy present.   Neurological:      Mental Status: She is alert.         Assessment/Plan:     Diagnosis and associated orders:     1. Exudative tonsillitis  amoxicillin (AMOXIL) 500 MG Cap         Comments/MDM:     Patient's presentation and physical exam findings shows exudative tonsillitis.  High suspicion for streptococcal etiology.  High Centor score criteria.  Patient is self-pay so we will avoid strep testing at this time due to the cost of the test.  Patient was started on amoxicillin for empiric treatment.  She is agreeable to plan.  Discussed typical timeframe for resolution of symptoms.  Discussed bacterial versus viral etiology.  Vitals are stable.  No signs of peritonsillar abscess on exam.  Return precautions given.         Differential diagnosis, natural history, supportive care, and indications for immediate follow-up discussed.    Advised the patient to follow-up with the primary care physician for recheck, reevaluation, and consideration of further management.    Please note that this dictation was created using voice recognition software. I have made a reasonable attempt to correct obvious errors, but I expect that there are errors of grammar and possibly content that I did not discover before finalizing the note.    Electronically signed by Ahmet Dickerson PA-C.

## 2024-11-08 ENCOUNTER — HOSPITAL ENCOUNTER (OUTPATIENT)
Dept: RADIOLOGY | Facility: MEDICAL CENTER | Age: 44
End: 2024-11-08

## 2024-11-08 DIAGNOSIS — R10.11 RUQ PAIN: ICD-10-CM
